# Patient Record
Sex: MALE | Race: WHITE | Employment: FULL TIME | URBAN - METROPOLITAN AREA
[De-identification: names, ages, dates, MRNs, and addresses within clinical notes are randomized per-mention and may not be internally consistent; named-entity substitution may affect disease eponyms.]

---

## 2018-07-05 ENCOUNTER — TELEPHONE (OUTPATIENT)
Dept: NON INVASIVE DIAGNOSTICS | Age: 28
End: 2018-07-05

## 2018-07-05 ENCOUNTER — OFFICE VISIT (OUTPATIENT)
Dept: PRIMARY CARE CLINIC | Age: 28
End: 2018-07-05
Payer: COMMERCIAL

## 2018-07-05 VITALS
HEART RATE: 83 BPM | HEIGHT: 68 IN | SYSTOLIC BLOOD PRESSURE: 114 MMHG | BODY MASS INDEX: 19.85 KG/M2 | DIASTOLIC BLOOD PRESSURE: 80 MMHG | WEIGHT: 131 LBS | TEMPERATURE: 98 F | RESPIRATION RATE: 18 BRPM | OXYGEN SATURATION: 98 %

## 2018-07-05 DIAGNOSIS — R56.9 SEIZURE (HCC): ICD-10-CM

## 2018-07-05 DIAGNOSIS — R55 SYNCOPE AND COLLAPSE: Primary | ICD-10-CM

## 2018-07-05 PROCEDURE — 99214 OFFICE O/P EST MOD 30 MIN: CPT | Performed by: INTERNAL MEDICINE

## 2018-07-05 RX ORDER — LEVETIRACETAM 500 MG/1
500 TABLET ORAL DAILY
COMMUNITY
End: 2018-08-02 | Stop reason: DRUGHIGH

## 2018-07-05 ASSESSMENT — PATIENT HEALTH QUESTIONNAIRE - PHQ9
SUM OF ALL RESPONSES TO PHQ9 QUESTIONS 1 & 2: 0
1. LITTLE INTEREST OR PLEASURE IN DOING THINGS: 0
2. FEELING DOWN, DEPRESSED OR HOPELESS: 0
SUM OF ALL RESPONSES TO PHQ QUESTIONS 1-9: 0

## 2018-07-05 ASSESSMENT — ENCOUNTER SYMPTOMS
STRIDOR: 0
SHORTNESS OF BREATH: 0
BLURRED VISION: 0
EYE PAIN: 0
BLOOD IN STOOL: 0
DOUBLE VISION: 0
EYE REDNESS: 0
HEMOPTYSIS: 0
NAUSEA: 0
DIARRHEA: 0

## 2018-07-06 ENCOUNTER — TELEPHONE (OUTPATIENT)
Dept: NEUROLOGY | Age: 28
End: 2018-07-06

## 2018-07-06 NOTE — PROGRESS NOTES
denies any chest pain, SOB, palpitations or syncope. He denies a family history of sudden cardiac death. Patient Active Problem List    Diagnosis Date Noted    Syncope 04/06/2016       Past Medical History:   Diagnosis Date    Seizure ([address]     Syncope and collapse        Family History   Problem Relation Age of Onset    Heart Disease Mother        Social History   Substance Use Topics    Smoking status: Never Smoker    Smokeless tobacco: Never Used    Alcohol use Yes      Comment: occasional        Current Outpatient Prescriptions   Medication Sig Dispense Refill    levETIRAcetam (KEPPRA) 500 MG tablet Take 500 mg by mouth daily      Cholecalciferol (VITAMIN D3) 1000 units CAPS Take by mouth      Ascorbic Acid (VITAMIN C ADULT GUMMIES PO) Take by mouth      Multiple Vitamins-Minerals (MULTI COMPLETE PO) Take by mouth      Garlic 7250 MG CAPS Take by mouth       No current facility-administered medications for this visit. Allergies   Allergen Reactions    Red Dye Nausea And Vomiting    Sulfa Antibiotics Rash       ROS:   Constitutional: Negative for fever, activity change and appetite change. HENT: Negative for epistaxis. Eyes: Negative for diploplia, blurred vision. Respiratory: Negative for cough, chest tightness, shortness of breath and wheezing. Cardiovascular: pertinent positives in HPI  Gastrointestinal: Negative for abdominal pain and blood in stool. All other review of systems are negative     PHYSICAL EXAM:   Vitals:    07/13/18 0927   BP: 116/78   Pulse: 70   Resp: 14   Weight: 134 lb 12.8 oz (61.1 kg)   Height: 5' 8\" (1.727 m)      Constitutional: Well-developed, no acute distress  Eyes: conjunctivae normal, no xanthelasma   Ears, Nose, Throat: oral mucosa moist, no cyanosis   CV: no JVD. Regular rate and rhythm. Normal S1S2 and no S3. No murmurs, rubs, or gallops.  PMI is nondisplaced  Lungs: clear to auscultation bilaterally, normal respiratory effort without used of

## 2018-07-06 NOTE — TELEPHONE ENCOUNTER
MA left message (first attempt) for pt to return call and schedule a new pt appt for syncope/seizure.   Electronically signed by Alli Garrido on 7/6/18 at 9:10 AM

## 2018-07-09 ENCOUNTER — HOSPITAL ENCOUNTER (OUTPATIENT)
Age: 28
Discharge: HOME | End: 2018-07-11
Payer: COMMERCIAL

## 2018-07-09 DIAGNOSIS — R56.9 SEIZURE (HCC): ICD-10-CM

## 2018-07-09 DIAGNOSIS — R55 SYNCOPE AND COLLAPSE: ICD-10-CM

## 2018-07-09 LAB
ALBUMIN SERPL-MCNC: 4.6 G/DL (ref 3.5–5.2)
ALP BLD-CCNC: 83 U/L (ref 40–129)
ALT SERPL-CCNC: 12 U/L (ref 0–40)
ANION GAP SERPL CALCULATED.3IONS-SCNC: 12 MMOL/L (ref 7–16)
AST SERPL-CCNC: 19 U/L (ref 0–39)
BASOPHILS ABSOLUTE: 0.05 E9/L (ref 0–0.2)
BASOPHILS RELATIVE PERCENT: 0.8 % (ref 0–2)
BILIRUB SERPL-MCNC: 1.1 MG/DL (ref 0–1.2)
BUN BLDV-MCNC: 14 MG/DL (ref 6–20)
CALCIUM SERPL-MCNC: 9.4 MG/DL (ref 8.6–10.2)
CHLORIDE BLD-SCNC: 101 MMOL/L (ref 98–107)
CO2: 26 MMOL/L (ref 22–29)
CREAT SERPL-MCNC: 0.7 MG/DL (ref 0.7–1.2)
EOSINOPHILS ABSOLUTE: 0.21 E9/L (ref 0.05–0.5)
EOSINOPHILS RELATIVE PERCENT: 3.4 % (ref 0–6)
GFR AFRICAN AMERICAN: >60
GFR NON-AFRICAN AMERICAN: >60 ML/MIN/1.73
GLUCOSE BLD-MCNC: 90 MG/DL (ref 74–109)
HCT VFR BLD CALC: 46.1 % (ref 37–54)
HEMOGLOBIN: 15.7 G/DL (ref 12.5–16.5)
IMMATURE GRANULOCYTES #: 0.01 E9/L
IMMATURE GRANULOCYTES %: 0.2 % (ref 0–5)
LYMPHOCYTES ABSOLUTE: 2.55 E9/L (ref 1.5–4)
LYMPHOCYTES RELATIVE PERCENT: 41.3 % (ref 20–42)
MCH RBC QN AUTO: 31.5 PG (ref 26–35)
MCHC RBC AUTO-ENTMCNC: 34.1 % (ref 32–34.5)
MCV RBC AUTO: 92.4 FL (ref 80–99.9)
MONOCYTES ABSOLUTE: 0.46 E9/L (ref 0.1–0.95)
MONOCYTES RELATIVE PERCENT: 7.4 % (ref 2–12)
NEUTROPHILS ABSOLUTE: 2.9 E9/L (ref 1.8–7.3)
NEUTROPHILS RELATIVE PERCENT: 46.9 % (ref 43–80)
PDW BLD-RTO: 12.6 FL (ref 11.5–15)
PLATELET # BLD: 211 E9/L (ref 130–450)
PMV BLD AUTO: 9.7 FL (ref 7–12)
POTASSIUM SERPL-SCNC: 3.8 MMOL/L (ref 3.5–5)
RBC # BLD: 4.99 E12/L (ref 3.8–5.8)
SODIUM BLD-SCNC: 139 MMOL/L (ref 132–146)
TOTAL PROTEIN: 7.1 G/DL (ref 6.4–8.3)
WBC # BLD: 6.2 E9/L (ref 4.5–11.5)

## 2018-07-09 PROCEDURE — 84443 ASSAY THYROID STIM HORMONE: CPT

## 2018-07-09 PROCEDURE — 80053 COMPREHEN METABOLIC PANEL: CPT

## 2018-07-09 PROCEDURE — 85025 COMPLETE CBC W/AUTO DIFF WBC: CPT

## 2018-07-10 LAB — TSH SERPL DL<=0.05 MIU/L-ACNC: 2.82 UIU/ML (ref 0.27–4.2)

## 2018-07-13 ENCOUNTER — TELEPHONE (OUTPATIENT)
Dept: PRIMARY CARE CLINIC | Age: 28
End: 2018-07-13

## 2018-07-13 ENCOUNTER — OFFICE VISIT (OUTPATIENT)
Dept: NON INVASIVE DIAGNOSTICS | Age: 28
End: 2018-07-13
Payer: COMMERCIAL

## 2018-07-13 VITALS
HEIGHT: 68 IN | DIASTOLIC BLOOD PRESSURE: 78 MMHG | HEART RATE: 70 BPM | BODY MASS INDEX: 20.43 KG/M2 | SYSTOLIC BLOOD PRESSURE: 116 MMHG | RESPIRATION RATE: 14 BRPM | WEIGHT: 134.8 LBS

## 2018-07-13 DIAGNOSIS — R55 SYNCOPE, UNSPECIFIED SYNCOPE TYPE: Primary | ICD-10-CM

## 2018-07-13 PROCEDURE — 99204 OFFICE O/P NEW MOD 45 MIN: CPT | Performed by: INTERNAL MEDICINE

## 2018-07-13 PROCEDURE — 93000 ELECTROCARDIOGRAM COMPLETE: CPT | Performed by: INTERNAL MEDICINE

## 2018-07-13 NOTE — TELEPHONE ENCOUNTER
Advise him I review cardiology evaluation, agree with the plan.   Will discuss with him at up coming visit

## 2018-07-19 ENCOUNTER — HOSPITAL ENCOUNTER (OUTPATIENT)
Dept: CARDIOLOGY | Age: 28
Discharge: HOME | End: 2018-07-19
Payer: COMMERCIAL

## 2018-07-19 DIAGNOSIS — R55 SYNCOPE, UNSPECIFIED SYNCOPE TYPE: ICD-10-CM

## 2018-07-19 LAB
LV EF: 60 %
LVEF MODALITY: NORMAL

## 2018-07-19 PROCEDURE — 93306 TTE W/DOPPLER COMPLETE: CPT

## 2018-07-20 ENCOUNTER — TELEPHONE (OUTPATIENT)
Dept: NON INVASIVE DIAGNOSTICS | Age: 28
End: 2018-07-20

## 2018-07-25 ENCOUNTER — HOSPITAL ENCOUNTER (OUTPATIENT)
Dept: CARDIAC CATH/INVASIVE PROCEDURES | Age: 28
Discharge: HOME | End: 2018-07-25
Payer: COMMERCIAL

## 2018-07-25 VITALS
OXYGEN SATURATION: 100 % | HEART RATE: 75 BPM | SYSTOLIC BLOOD PRESSURE: 127 MMHG | DIASTOLIC BLOOD PRESSURE: 90 MMHG | RESPIRATION RATE: 15 BRPM | WEIGHT: 130 LBS | BODY MASS INDEX: 19.7 KG/M2 | HEIGHT: 68 IN | TEMPERATURE: 98.6 F

## 2018-07-25 PROCEDURE — 93660 TILT TABLE EVALUATION: CPT | Performed by: INTERNAL MEDICINE

## 2018-07-25 NOTE — PROCEDURES
[address] [facility] Electrophysiology  Procedure Report  PATIENT: Narcisa Horvath  MEDICAL RECORD NUMBER: [id number]  DATE OF PROCEDURE:  7/25/2018  ATTENDING ELECTROPHYSIOLOGIST:  Citlaly Zambrano MD  REFERRING PHYSICIAN: Dr. Subhash Singh:    1. Tilt Table Testing    INDICATION:  Syncope    PROCEDURE PERFORMED BY: Citlaly Zambrano MD    PROCEDURE TIME: Thirty minutes    COMPLICATIONS: None immediately apparent    DESCRIPTION OF PROCEDURE: The risks, benefits, and alternatives to the procedure were discussed with the patient, and informed consent was obtained. The patient was brought to the Tilt table lab. Baseline supine blood pressures and heart rates were obtained. The baseline blood pressure was 125/77 mmHg and baseline heart rate is 69 bpm.  After 5 minutes in the supine position, the patient was placed in the 70 degree head-upright position. After approximately 20 minutes no symptoms had occurred and thus, a single 0.4mg sublingual nitroglycerine tablet was given and the 70 degree head-upright position was continued for 10 minutes. The gurpreet BP recorded was 78/palpable mmHg with a heart rate of 104 bpm.  The patient had symptoms of headache and nausea. The patient was placed back down in the supine position. At the conclusion of tilt table testing, the patient's blood pressure and heart rate were back at baseline. SUMMARY:  1. Negative tilt table test for Neurocardiogenic syncope. RECOMMENDATIONS:  1. Maintain adequate hydration. Drink approximately 1-2 L of non-caffeinated beverage/ per day. 2. Limit caffeine and alcohol use. 3. Liberalize salt intake. 4. If prescribed, please wear the support stockings or compression socks as instructed. 5. If medication is prescribed, please take it as instructed. 6. Advised to see neurologist for work up for possible seizure.     Citlaly Zambrano MD  Cardiac Electrophysiology  [facility] [place] ([facility] Physicians  [facility]  9:51 AM  7/25/2018

## 2018-08-02 ENCOUNTER — OFFICE VISIT (OUTPATIENT)
Dept: PRIMARY CARE CLINIC | Age: 28
End: 2018-08-02
Payer: COMMERCIAL

## 2018-08-02 VITALS
WEIGHT: 135.5 LBS | TEMPERATURE: 98.3 F | SYSTOLIC BLOOD PRESSURE: 102 MMHG | RESPIRATION RATE: 18 BRPM | HEART RATE: 80 BPM | BODY MASS INDEX: 20.54 KG/M2 | OXYGEN SATURATION: 97 % | DIASTOLIC BLOOD PRESSURE: 64 MMHG | HEIGHT: 68 IN

## 2018-08-02 DIAGNOSIS — Z86.69 HX OF SEIZURE DISORDER: ICD-10-CM

## 2018-08-02 DIAGNOSIS — R55 SYNCOPE, UNSPECIFIED SYNCOPE TYPE: Primary | ICD-10-CM

## 2018-08-02 PROCEDURE — 99213 OFFICE O/P EST LOW 20 MIN: CPT | Performed by: INTERNAL MEDICINE

## 2018-08-02 RX ORDER — LEVETIRACETAM 500 MG/1
500 TABLET, EXTENDED RELEASE ORAL DAILY
Qty: 30 TABLET | Refills: 1 | Status: SHIPPED | OUTPATIENT
Start: 2018-08-02 | End: 2018-09-13 | Stop reason: SDUPTHER

## 2018-08-02 RX ORDER — LEVETIRACETAM 500 MG/1
500 TABLET, EXTENDED RELEASE ORAL DAILY
COMMUNITY
End: 2018-08-02 | Stop reason: SDUPTHER

## 2018-08-02 ASSESSMENT — ENCOUNTER SYMPTOMS
DOUBLE VISION: 0
BLOOD IN STOOL: 0
HEMOPTYSIS: 0
SHORTNESS OF BREATH: 0
NAUSEA: 0
BLURRED VISION: 0
DIARRHEA: 0
EYE REDNESS: 0
EYE PAIN: 0
STRIDOR: 0

## 2018-08-02 NOTE — PROGRESS NOTES
HPI:       Patient was here last 7/5/18    Chief complaint reason for the visit follow-up visit to evaluation for complaints of syncope and/or seizure disorder. Thus far the patient has been seen by electrophysiology who has done an echocardiogram tilt table test.  Both studies were negative and reviewed by me with the patient. The patient is now wearing a 30 day cardiac monitor which will be completed within the next 2 weeks. He is awaiting a visit with neurology which will not occur until September 13. He remains on Keppra 500 mg, extended release, 1 daily he has had no further complaints of any episodes of near syncope and/or seizures since I saw him last.    We reviewed laboratory studies were performed including CBC, thyroid studies, metabolic profile all of which were normal.    Review of Systems  Review of Systems   Constitutional: Negative for chills and fever. HENT: Negative for congestion, ear discharge and ear pain. Eyes: Negative for blurred vision, double vision, pain and redness. Respiratory: Negative for hemoptysis, shortness of breath and stridor. Cardiovascular: Negative for chest pain, claudication, leg swelling and PND. Gastrointestinal: Negative for blood in stool, diarrhea and nausea. Genitourinary: Negative for dysuria, hematuria and urgency. Musculoskeletal: Negative for falls and joint pain. Skin: Negative for rash. Neurological: Negative for dizziness, focal weakness, seizures and loss of consciousness. Psychiatric/Behavioral: Negative for depression, hallucinations, memory loss and suicidal ideas. PE:  VS:  /64 (Site: Left Arm, Position: Sitting, Cuff Size: Small Adult)   Pulse 80   Temp 98.3 °F (36.8 °C) (Tympanic)   Resp 18   Ht 5' 8\" (1.727 m)   Wt 135 lb 8 oz (61.5 kg)   SpO2 97%   BMI 20.60 kg/m²   Physical Exam   Constitutional: He is oriented to person, place, and time. He appears well-developed and well-nourished.    HENT:   Head:

## 2018-08-15 DIAGNOSIS — R55 SYNCOPE, UNSPECIFIED SYNCOPE TYPE: ICD-10-CM

## 2018-08-16 ENCOUNTER — TELEPHONE (OUTPATIENT)
Dept: NON INVASIVE DIAGNOSTICS | Age: 28
End: 2018-08-16

## 2018-08-27 NOTE — PROGRESS NOTES
[address] [facility] [facility]  Progress Note  PATIENT: Eduar Hayes  MEDICAL RECORD NUMBER: [id number]  DATE OF SERVICE:  8/29/2018  ATTENDING ELECTROPHYSIOLOGIST: Laron Hopkins MD  REFERRING PHYSICIAN: Qamar Yen MD  CHIEF COMPLAINT: Loss of consciousness    HPI: This is a 28 y.o. male with a history of   Patient Active Problem List   Diagnosis    Syncope    Hx of seizure disorder   who presents to EP clinic for follow up of his syncope. Since last visit on 7/13/18 he underwent echocardiogram, 30 days cardiac monitor and TTT which were normal. He is scheduled to see neurologist on 9/13/18. He states he feels overall well and can complete his ADL's. He presents today in NSR and denies any chest pain, dyspnea, palpitations or syncope. Patient Active Problem List    Diagnosis Date Noted    Hx of seizure disorder 08/02/2018    Syncope 04/06/2016       Past Medical History:   Diagnosis Date    Seizure ([address]     Syncope and collapse        Family History   Problem Relation Age of Onset    Heart Disease Mother        Social History   Substance Use Topics    Smoking status: Never Smoker    Smokeless tobacco: Never Used    Alcohol use Yes      Comment: occasional        Current Outpatient Prescriptions   Medication Sig Dispense Refill    MAGNESIUM-POTASSIUM PO Take by mouth daily      levETIRAcetam (KEPPRA XR) 500 MG TB24 extended release tablet Take 1 tablet by mouth daily 30 tablet 1    Cholecalciferol (VITAMIN D3) 1000 units CAPS Take by mouth      Ascorbic Acid (VITAMIN C ADULT GUMMIES PO) Take by mouth      Multiple Vitamins-Minerals (MULTI COMPLETE PO) Take by mouth      Garlic 1105 MG CAPS Take by mouth       No current facility-administered medications for this visit.          Allergies   Allergen Reactions    Red Dye Nausea And Vomiting    Sulfa Antibiotics Rash       ROS:   Constitutional: Negative for fever, activity change and appetite change. HENT: Negative for epistaxis. Eyes: Negative for diploplia, blurred vision. Respiratory: Negative for cough, chest tightness, shortness of breath and wheezing. Cardiovascular: pertinent positives in HPI  Gastrointestinal: Negative for abdominal pain and blood in stool. All other review of systems are negative     PHYSICAL EXAM:   Vitals:    08/29/18 0844   BP: 110/66   Pulse: 75   Resp: 16   Weight: 135 lb (61.2 kg)   Height: 5' 8\" (1.727 m)      Constitutional: Well-developed, no acute distress  Eyes: conjunctivae normal, no xanthelasma   Ears, Nose, Throat: oral mucosa moist, no cyanosis   CV: no JVD. Regular rate and rhythm. Normal S1S2 and no S3. No murmurs, rubs, or gallops. PMI is nondisplaced  Lungs: clear to auscultation bilaterally, normal respiratory effort without used of accessory muscles  Abdomen: soft, non-tender, bowel sounds present, no masses or hepatomegaly   Musculoskeletal: no digital clubbing, no edema   Skin: warm, no rashes     I have personally reviewed the laboratory, cardiac diagnostic and radiographic testing as outlined below:    Data:    No results for input(s): WBC, HGB, HCT, PLT in the last 72 hours. No results for input(s): NA, K, CL, CO2, BUN, CREATININE, CALCIUM in the last 72 hours. Invalid input(s): GLU, MAGNESIUM   Lab Results   Component Value Date    MG 2.3 09/11/2017     No results for input(s): TSH in the last 72 hours. No results for input(s): INR in the last 72 hours. CXR 9/11/17:  Chest AP view.        Comparison: April 6, 2016.       History: Chest pain.       FINDINGS: Cardiac area has normal size. The lungs are well expanded. No acute infiltrates, consolidation or pleural effusions are seen.        There is no perihilar vascular congestion.            Impression   No acute cardiopulmonary process     EKG 8/29/18: sinus rhythm 75 bpm, QTc 398, no delta wave, Q waves in lateral leads.  (Please see scan in effusion.      Conclusions      Summary   Normal left ventricle size and systolic function   Ejection fraction is visually estimated at 60%.   Physiologic and/or trace mitral regurgitation is present.      Signature      ----------------------------------------------------------------   Electronically signed by Anselmo Maza MD(Interpreting   physician) on 04/07/2016 07:05 PM   ----------------------------------------------------------------     M-Mode/2D Measurements & Calculations      LV Diastolic     LV Systolic Dimension: 3 cm     AV Cusp Separation: 1.8   Dimension: 4.8   LV Volume Diastolic: 794.8 ml   cmAO Root Dimension: 2.8   cm               LV Volume Systolic: 36.0 ml     cm   LV FS:37.5 %     LV EDV/LV EDV Index: 108. 6   LV PW Diastolic: VI/68 DJ/G^3ZD ESV/LV ESV   0.6 cm           Index: 36.3 ml/21ml/ m^2   Septum           EF Calculated: 66.6 %           RV Diastolic Dimension:   Diastolic: 0.6   LV Mass Index: 50 l/min*m^2     2.1 cm   cm                                                    LA/Aorta: 1.07   LV Mass: 88.3 g                                                    LA volume/Index: 26.19 ml                                                    /14. 88ml/m^2     Doppler Measurements & Calculations      MV Peak E-Wave:   AV Peak Velocity: 1.2 m/s    LVOT Peak Velocity: 1.08   1.08 m/s          AV Peak Gradient: 5.74 mmHg  m/s   MV Peak A-Wave:   AV Mean Velocity: 0.81 m/s   LVOT Mean Velocity: 0.78   0.43 m/s          AV Mean Gradient: 2.9 mmHg   m/s   MV E/A Ratio:     AV VTI: 22.9 cm              LVOT Peak Gradient: 4.6   2.51                                           mmHgLVOT Mean Gradient: 2.7   MV Peak Gradient: LVOT VTI: 20.7 cm            mmHg   4.7 mmHg   MV Mean Gradient:   1.6 mmHg          Pulm. Vein A Reversal   MV Mean Velocity: Duration:88.7 msec           TR Velocity:1.92 m/s   0.59 m/s          Pulm. Vein D Velocity:0.49   TR Gradient:14.75 mmHg   MV Deceleration   m/sPulm.  Vein A Reversal     PV Peak Velocity: 0.83 m/s   Time: 206.9 msec  Velocity:0.21 m/s            PV Peak Gradient: 2.76 mmHg   MV P1/2t: 105. 9   Pulm. Vein S Velocity: 0.41  PV Mean Velocity: 0.59 m/s   msec              m/s                          PV Mean Gradient: 1.5 mmHg   MVA by PHT:2.08   cm^2      MV E' Septal   Velocity: 0.18   m/s   MV E' Lateral   Velocity: 0.18 m/s    Echo 7/19/18:  Type of Study      TTE procedure:Echo Complete W/ Dop & Color Flow.     Procedure Date  Date: 07/19/2018 Start: 11:03 AM    Study Location: Echo Lab  Technical Quality: Adequate visualization    Indications:LV function. Patient Status: Routine    Height: 68 inches Weight: 134 pounds BSA: 1.72 m^2 BMI: 20.37 kg/m^2    BP: 116/78 mmHg     Findings      Left Ventricle   Left ventricular internal dimensions, wall thickness, regional wall   motion, and systolic function appear to be normal.   Ejection fraction is visually estimated at 60%.  Doppler indices of diastolic function are normal.      Right Ventricle   Normal right ventricular size and systolic function.   TAPSE is 23 mm consistent with normal global right ventricular systolic   function.      Left Atrium   The left atrium appeared normal in size. No obvious mass or thrombus was   seen.      Right Atrium   Normal right atrium.      Mitral Valve   Normal mitral valve structure and function.   Physiologic and/or trace mitral regurgitation is present.   No evidence of mitral valve stenosis.      Tricuspid Valve   Normal tricuspid valve structure and function. Physiologic tricuspid   insufficiency noted. Right ventricular systolic pressure is within normal   limits.      Aortic Valve   The aortic valve is trileaflet with good leaflet separation. No   significant aortic stenosis or insufficiency is present.      Pulmonic Valve   Normal pulmonic valve structure and function.  Physiologic pulmonic   insufficiency is present.      Pericardial Effusion   No evidence for hemodynamically significant pericardial effusion.      Aorta   Normal aortic root and ascending aorta.   Miscellaneous   The inferior vena cava diameter is normal with normal respiratory   variation.      Conclusions      Summary   Left ventricular internal dimensions, wall thickness, regional wall   motion, and systolic function appear to be normal.   Ejection fraction is visually estimated at 60%.    Doppler indices of diastolic function are normal.   No abnormality of chamber dimensions or valve functions is noted.   Normal echocardiogram.      Signature      ----------------------------------------------------------------   Electronically signed by Stu Calix MD(Interpreting   physician) on 07/19/2018 06:29 PM   ----------------------------------------------------------------     M-Mode/2D Measurements & Calculations      LV Diastolic    LV Systolic Dimension: 2.9   AV Cusp Separation: 1.8 cmLA   Dimension: 4.3  cm                           Dimension: 2.7 cmAO Root   cm              LV Volume Diastolic: 40.5 ml Dimension: 2.5 cm   LV FS:32.6 %    LV Volume Systolic: 75.4 ml   LV PW           LV EDV/LV EDV Index: 76.2   Diastolic: 0.8  ZA/00 FM/R^5PO ESV/LV ESV   cm              Index: 31.9 ml/19ml/ m^2     RV Diastolic Dimension: 2.3   LV PW Systolic: EF Calculated: 35.3 %        cm   1.2 cm          LV Mass Index: 56 l/min*m^2   Septum                                       LA/Aorta: 1.2   Diastolic: 0.7                               Ascending Aorta: 2.2 cm   cm              LVOT: 2 cm                   LA volume/Index: 23.3 ml   Septum                                       /53IL/R^1   Systolic: 1 cm                               RA Area: 10.3 cm^2      LV Mass: 96.78   g     Doppler Measurements & Calculations      MV Peak E-Wave:   AV Peak Velocity: 1.38 m/s     LVOT Peak Velocity: 1.26   1.1 m/s           AV Peak Gradient: 7.66 mmHg    m/s   MV Peak A-Wave:   AV Mean Velocity: 0.87 m/s     LVOT Mean placed in the 70 degree head-upright position. After approximately 20 minutes no symptoms had occurred and thus, a single 0.4mg sublingual nitroglycerine tablet was given and the 70 degree head-upright position was continued for 10 minutes. The gurpreet BP recorded was 78/palpable mmHg with a heart rate of 104 bpm.  The patient had symptoms of headache and nausea. The patient was placed back down in the supine position. At the conclusion of tilt table testing, the patient's blood pressure and heart rate were back at baseline.     SUMMARY:  1. Negative tilt table test for Neurocardiogenic syncope.     RECOMMENDATIONS:  1. Maintain adequate hydration. Drink approximately 1-2 L of non-caffeinated beverage/ per day. 2. Limit caffeine and alcohol use. 3. Liberalize salt intake. 4. If prescribed, please wear the support stockings or compression socks as instructed. 5. If medication is prescribed, please take it as instructed. 6. Advised to see neurologist for work up for possible seizure.     I have independently reviewed all of the ECGs and rhythm strips per above     Assessment/Plan: This is a 28 y.o. male with a history of   Patient Active Problem List   Diagnosis    Syncope    Hx of seizure disorder    who presents with recurrent loss of consciousness    1. Recurrent loss of consciousness. - Unlikely to be vasovagal or cardiac in origin.  - Possible seizure. - Await for neurological input from neurology. - Continue anticonvulsant.  - Encouraged adequate hydration, not skipping meals and avoiding caffeine . Recommendations:  1. Will continue current treatment. 2. Await for neurology's input. 3. Follow up as needed and encourage to call for any questions or concerns. I have spent a total of 30 minutes with the patient reviewing the above stated recommendations.   And a total of >50% of that time involved face-to-face time providing counseling and or coordination of care with the other

## 2018-08-29 ENCOUNTER — OFFICE VISIT (OUTPATIENT)
Dept: NON INVASIVE DIAGNOSTICS | Age: 28
End: 2018-08-29
Payer: COMMERCIAL

## 2018-08-29 VITALS
DIASTOLIC BLOOD PRESSURE: 66 MMHG | RESPIRATION RATE: 16 BRPM | SYSTOLIC BLOOD PRESSURE: 110 MMHG | HEART RATE: 75 BPM | HEIGHT: 68 IN | BODY MASS INDEX: 20.46 KG/M2 | WEIGHT: 135 LBS

## 2018-08-29 DIAGNOSIS — R55 SYNCOPE AND COLLAPSE: Primary | ICD-10-CM

## 2018-08-29 PROCEDURE — 99214 OFFICE O/P EST MOD 30 MIN: CPT | Performed by: INTERNAL MEDICINE

## 2018-08-29 PROCEDURE — 93000 ELECTROCARDIOGRAM COMPLETE: CPT | Performed by: INTERNAL MEDICINE

## 2018-08-29 NOTE — PROGRESS NOTES
This patient was supposed to be seen by Neurology, please find out when that appointment will take place. Jocy Rangel do I see him back in the ofifce?

## 2018-09-13 ENCOUNTER — OFFICE VISIT (OUTPATIENT)
Dept: NEUROLOGY | Age: 28
End: 2018-09-13
Payer: COMMERCIAL

## 2018-09-13 VITALS
WEIGHT: 132.8 LBS | HEIGHT: 68 IN | OXYGEN SATURATION: 99 % | DIASTOLIC BLOOD PRESSURE: 78 MMHG | SYSTOLIC BLOOD PRESSURE: 125 MMHG | HEART RATE: 94 BPM | TEMPERATURE: 96.9 F | BODY MASS INDEX: 20.13 KG/M2

## 2018-09-13 DIAGNOSIS — Z86.69 HX OF SEIZURE DISORDER: ICD-10-CM

## 2018-09-13 DIAGNOSIS — R56.9 SEIZURES (HCC): Primary | ICD-10-CM

## 2018-09-13 PROCEDURE — 99204 OFFICE O/P NEW MOD 45 MIN: CPT | Performed by: CLINICAL NURSE SPECIALIST

## 2018-09-13 RX ORDER — LEVETIRACETAM 500 MG/1
500 TABLET, EXTENDED RELEASE ORAL DAILY
Qty: 30 TABLET | Refills: 11 | Status: SHIPPED | OUTPATIENT
Start: 2018-09-13 | End: 2019-05-17 | Stop reason: DRUGHIGH

## 2018-12-27 NOTE — PROGRESS NOTES
driving   His last seizure was November 2017     Nia Glass  3:36 PM  9/13/2018    I spent 45 minutes with the patient, with 50% or more counseling them on their diagnosis, diagnostic workup and treatment options. Normal

## 2019-04-01 ENCOUNTER — HOSPITAL ENCOUNTER (EMERGENCY)
Age: 29
Discharge: HOME | End: 2019-04-01
Attending: EMERGENCY MEDICINE
Payer: COMMERCIAL

## 2019-04-01 VITALS
TEMPERATURE: 97.9 F | HEIGHT: 70 IN | BODY MASS INDEX: 20.76 KG/M2 | RESPIRATION RATE: 14 BRPM | HEART RATE: 82 BPM | SYSTOLIC BLOOD PRESSURE: 107 MMHG | OXYGEN SATURATION: 98 % | DIASTOLIC BLOOD PRESSURE: 67 MMHG | WEIGHT: 145 LBS

## 2019-04-01 DIAGNOSIS — R56.9 SEIZURE-LIKE ACTIVITY (HCC): Primary | ICD-10-CM

## 2019-04-01 LAB
ANION GAP SERPL CALCULATED.3IONS-SCNC: 19 MMOL/L (ref 7–16)
BASOPHILS ABSOLUTE: 0.04 E9/L (ref 0–0.2)
BASOPHILS RELATIVE PERCENT: 0.4 % (ref 0–2)
BUN BLDV-MCNC: 15 MG/DL (ref 6–20)
CALCIUM SERPL-MCNC: 9.6 MG/DL (ref 8.6–10.2)
CHLORIDE BLD-SCNC: 97 MMOL/L (ref 98–107)
CO2: 20 MMOL/L (ref 22–29)
CREAT SERPL-MCNC: 0.7 MG/DL (ref 0.7–1.2)
EOSINOPHILS ABSOLUTE: 0.05 E9/L (ref 0.05–0.5)
EOSINOPHILS RELATIVE PERCENT: 0.5 % (ref 0–6)
GFR AFRICAN AMERICAN: >60
GFR NON-AFRICAN AMERICAN: >60 ML/MIN/1.73
GLUCOSE BLD-MCNC: 105 MG/DL (ref 74–99)
HCT VFR BLD CALC: 44 % (ref 37–54)
HEMOGLOBIN: 15.9 G/DL (ref 12.5–16.5)
IMMATURE GRANULOCYTES #: 0.04 E9/L
IMMATURE GRANULOCYTES %: 0.4 % (ref 0–5)
LYMPHOCYTES ABSOLUTE: 1.73 E9/L (ref 1.5–4)
LYMPHOCYTES RELATIVE PERCENT: 17.3 % (ref 20–42)
MCH RBC QN AUTO: 32.6 PG (ref 26–35)
MCHC RBC AUTO-ENTMCNC: 36.1 % (ref 32–34.5)
MCV RBC AUTO: 90.2 FL (ref 80–99.9)
METER GLUCOSE: 109 MG/DL (ref 74–99)
MONOCYTES ABSOLUTE: 0.53 E9/L (ref 0.1–0.95)
MONOCYTES RELATIVE PERCENT: 5.3 % (ref 2–12)
NEUTROPHILS ABSOLUTE: 7.59 E9/L (ref 1.8–7.3)
NEUTROPHILS RELATIVE PERCENT: 76.1 % (ref 43–80)
PDW BLD-RTO: 12.2 FL (ref 11.5–15)
PLATELET # BLD: 249 E9/L (ref 130–450)
PMV BLD AUTO: 8.9 FL (ref 7–12)
POTASSIUM SERPL-SCNC: 3.2 MMOL/L (ref 3.5–5)
RBC # BLD: 4.88 E12/L (ref 3.8–5.8)
SODIUM BLD-SCNC: 136 MMOL/L (ref 132–146)
WBC # BLD: 10 E9/L (ref 4.5–11.5)

## 2019-04-01 PROCEDURE — 6370000000 HC RX 637 (ALT 250 FOR IP): Performed by: EMERGENCY MEDICINE

## 2019-04-01 PROCEDURE — 80048 BASIC METABOLIC PNL TOTAL CA: CPT

## 2019-04-01 PROCEDURE — 82962 GLUCOSE BLOOD TEST: CPT

## 2019-04-01 PROCEDURE — 6360000002 HC RX W HCPCS: Performed by: EMERGENCY MEDICINE

## 2019-04-01 PROCEDURE — 99285 EMERGENCY DEPT VISIT HI MDM: CPT

## 2019-04-01 PROCEDURE — 85025 COMPLETE CBC W/AUTO DIFF WBC: CPT

## 2019-04-01 PROCEDURE — 96365 THER/PROPH/DIAG IV INF INIT: CPT

## 2019-04-01 RX ORDER — LEVETIRACETAM 10 MG/ML
1000 INJECTION INTRAVASCULAR ONCE
Status: COMPLETED | OUTPATIENT
Start: 2019-04-01 | End: 2019-04-01

## 2019-04-01 RX ORDER — POTASSIUM CHLORIDE 20 MEQ/1
40 TABLET, EXTENDED RELEASE ORAL DAILY
Status: DISCONTINUED | OUTPATIENT
Start: 2019-04-01 | End: 2019-04-01 | Stop reason: HOSPADM

## 2019-04-01 RX ORDER — PROMETHAZINE HYDROCHLORIDE 25 MG/ML
25 INJECTION, SOLUTION INTRAMUSCULAR; INTRAVENOUS ONCE
Status: DISCONTINUED | OUTPATIENT
Start: 2019-04-01 | End: 2019-04-01 | Stop reason: HOSPADM

## 2019-04-01 RX ADMIN — POTASSIUM BICARBONATE 40 MEQ: 782 TABLET, EFFERVESCENT ORAL at 20:39

## 2019-04-01 RX ADMIN — LEVETIRACETAM 1000 MG: 10 INJECTION INTRAVENOUS at 19:25

## 2019-04-01 ASSESSMENT — ENCOUNTER SYMPTOMS
NAUSEA: 0
ABDOMINAL PAIN: 0
SHORTNESS OF BREATH: 0
BACK PAIN: 0
DIARRHEA: 0
COUGH: 0

## 2019-04-01 NOTE — ED PROVIDER NOTES
This is a 33 year male with a PMH of Seizure Disoder who presents to the ED with a complaint of seizure like activity. 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 The patient remarks that he then felt as though he was going to have a seizure and had a vision of many colors and had a slumping to his piano. He states that this lasted for five minutes. He states that he has had over 8 episodes over the years similar to these sensations. He states that when he holds his urine or stool in and cant use the restroom these seem to come on. The patient denies any head trauma, use of alchol or drugs. The history is provided by the patient. No  was used. Review of Systems   Constitutional: Negative for fever. HENT: Negative for congestion. Eyes: Negative for visual disturbance. Respiratory: Negative for cough and shortness of breath. Cardiovascular: Negative for chest pain. Gastrointestinal: Negative for abdominal pain, diarrhea and nausea. Endocrine: Negative for polyuria. Genitourinary: Negative for dysuria. Musculoskeletal: Negative for back pain. Skin: Positive for wound. Allergic/Immunologic: Negative for immunocompromised state. Neurological: Positive for seizures. Hematological: Does not bruise/bleed easily. Psychiatric/Behavioral: Negative for confusion. Physical Exam   Constitutional: He is oriented to person, place, and time. He appears well-developed and well-nourished. No distress. HENT:   Head: Normocephalic and atraumatic. Mouth/Throat: Oropharynx is clear and moist.   Superficial abrasion to tongue   Eyes: Pupils are equal, round, and reactive to light. EOM are normal.   Neck: Normal range of motion. Neck supple. Cardiovascular: Normal rate and regular rhythm. Pulmonary/Chest: Effort normal and breath sounds normal. No stridor. No respiratory distress. He has no wheezes.    Abdominal:

## 2019-04-09 ENCOUNTER — OFFICE VISIT (OUTPATIENT)
Dept: PRIMARY CARE CLINIC | Age: 29
End: 2019-04-09
Payer: COMMERCIAL

## 2019-04-09 VITALS
BODY MASS INDEX: 19.26 KG/M2 | HEIGHT: 69 IN | RESPIRATION RATE: 12 BRPM | HEART RATE: 82 BPM | OXYGEN SATURATION: 98 % | DIASTOLIC BLOOD PRESSURE: 78 MMHG | WEIGHT: 130 LBS | SYSTOLIC BLOOD PRESSURE: 106 MMHG | TEMPERATURE: 98.5 F

## 2019-04-09 DIAGNOSIS — F41.9 ANXIETY: ICD-10-CM

## 2019-04-09 DIAGNOSIS — R55 SYNCOPE AND COLLAPSE: Primary | ICD-10-CM

## 2019-04-09 PROCEDURE — 99215 OFFICE O/P EST HI 40 MIN: CPT | Performed by: NURSE PRACTITIONER

## 2019-04-09 ASSESSMENT — ENCOUNTER SYMPTOMS
COLOR CHANGE: 0
COUGH: 0
VOMITING: 0
SHORTNESS OF BREATH: 0
CONSTIPATION: 1
CHEST TIGHTNESS: 0
ABDOMINAL PAIN: 0
NAUSEA: 0

## 2019-04-09 ASSESSMENT — PATIENT HEALTH QUESTIONNAIRE - PHQ9
1. LITTLE INTEREST OR PLEASURE IN DOING THINGS: 0
SUM OF ALL RESPONSES TO PHQ QUESTIONS 1-9: 0
2. FEELING DOWN, DEPRESSED OR HOPELESS: 0
SUM OF ALL RESPONSES TO PHQ9 QUESTIONS 1 & 2: 0
SUM OF ALL RESPONSES TO PHQ QUESTIONS 1-9: 0

## 2019-04-09 NOTE — PROGRESS NOTES
2019     Darshan Dear (:  1990) is a 33 y.o. male, here for evaluation of the following medical concerns:  Chief Complaint   Patient presents with    Follow-up     seizures        HPI:  33 y.o. male presents post ED visit for ? seizures     HX: presented In 2017 with episodes when eating dinner had an aura with \"lights flashing\" Knew something was going to happen, put head down and next thing her remembers EMT were there  Issue free all of 2018  April 1st 2019 same issue and was in ED .  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 The patient remarks that he then felt as though he was going to have a seizure and had a vision of many colors and had a slumping to his piano. He states that this lasted for five minutes. He states that he has had over 8 episodes over the years similar to these sensations. He states that when he holds his urine or stool in and cant use the restroom these seem to come on. The patient denies any head trauma, use of alchol or drugs     Did f/u with neurology last Sept and they believe it is seizures and continued med. Has appointment in May  Has  Had extensive cardiac w/u with tsh and lab,  tilt table testing, heart monitor, all normal and EEG normal.    He keeps mentioning his parents are worried. He is anxious and admitted to this after some discussion  So we had a long discussion about his stress levels and when these episodes occur . There are issues at school right now.  He agreed he may benefit from medication for his issues with anxiety. However, we will obtain a second opinion and proceed from there. ?pseudo seizures? Past Medical History:   Diagnosis Date    Seizure ([facility] [address]     Syncope and collapse        Current Outpatient Medications on File Prior to Visit   Medication Sig Dispense Refill    Potassium 99 MG TABS Take by mouth daily      levETIRAcetam (KEPPRA XR) 500 MG TB24 extended release tablet Take 1 tablet by mouth daily 30 tablet 11    MAGNESIUM-POTASSIUM PO Take by mouth daily      Cholecalciferol (VITAMIN D3) 1000 units CAPS Take by mouth      Ascorbic Acid (VITAMIN C ADULT GUMMIES PO) Take by mouth      Multiple Vitamins-Minerals (MULTI COMPLETE PO) Take by mouth      Garlic 4099 MG CAPS Take by mouth       No current facility-administered medications on file prior to visit. Allergies   Allergen Reactions    Red Dye Nausea And Vomiting    Sulfa Antibiotics Rash       Family History   Problem Relation Age of Onset    Heart Disease Mother        No past surgical history on file. Social History     Tobacco Use    Smoking status: Never Smoker    Smokeless tobacco: Never Used   Substance Use Topics    Alcohol use: Yes     Comment: occasional     Drug use: No       ROS:      Review of Systems   Constitutional: Positive for activity change and fatigue. Negative for appetite change, diaphoresis and fever. HENT: Negative. Respiratory: Negative for cough, chest tightness and shortness of breath. Cardiovascular: Negative for chest pain, palpitations and leg swelling. Gastrointestinal: Positive for constipation. Negative for abdominal pain, nausea and vomiting. Genitourinary: Negative. Musculoskeletal: Negative. Skin: Negative for color change and rash. Neurological: Positive for tremors and seizures. Negative for dizziness, facial asymmetry, speech difficulty, weakness, light-headedness and headaches.    Psychiatric/Behavioral: Positive for confusion, decreased concentration and sleep ASSESSMENT/PLAN:    John Paul Radford was seen today for follow-up. Diagnoses and all orders for this visit:    Syncope and collapse  Seek second opinion    Anxiety  Maybe try herbal such as rescue remedy         No orders of the defined types were placed in this encounter. No follow-ups on file. An electronic signature was used to authenticate this note.     --Anton Perry, LOGAN - CNP on 4/9/2019 at 2:30 PM

## 2019-05-15 ENCOUNTER — OFFICE VISIT (OUTPATIENT)
Dept: PRIMARY CARE CLINIC | Age: 29
End: 2019-05-15
Payer: COMMERCIAL

## 2019-05-15 VITALS
WEIGHT: 130.4 LBS | TEMPERATURE: 98.6 F | BODY MASS INDEX: 18.67 KG/M2 | OXYGEN SATURATION: 98 % | RESPIRATION RATE: 18 BRPM | SYSTOLIC BLOOD PRESSURE: 108 MMHG | DIASTOLIC BLOOD PRESSURE: 68 MMHG | HEIGHT: 70 IN | HEART RATE: 83 BPM

## 2019-05-15 DIAGNOSIS — Z86.69 HX OF SEIZURE DISORDER: Primary | ICD-10-CM

## 2019-05-15 DIAGNOSIS — E87.6 HYPOKALEMIA: ICD-10-CM

## 2019-05-15 PROCEDURE — 99214 OFFICE O/P EST MOD 30 MIN: CPT | Performed by: FAMILY MEDICINE

## 2019-05-15 NOTE — PROGRESS NOTES
nocturia, no dysuria    Vitals:    05/15/19 1528   BP: 108/68   Site: Left Upper Arm   Position: Sitting   Cuff Size: Small Adult   Pulse: 83   Resp: 18   Temp: 98.6 °F (37 °C)   TempSrc: Tympanic   SpO2: 98%   Weight: 130 lb 6.4 oz (59.1 kg)   Height: 5' 9.5\" (1.765 m)     Body mass index is 18.98 kg/m². General:  Patient alert and oriented x 3, NAD, pleasant  HEENT:  Atraumatic, normocephalic, pupils equal and normal, EOMI, clear conjunctiva, TM normal and clear, nose-clear, throat - no erythema  Neck:  Supple, no goiter, no carotid bruits, no LAD  Lungs:  CTA B, symmetric breath sounds, no resp distress  Heart:  RRR, no murmurs, gallops or rubs  Abdomen:  Soft/nt/nd, + bowel sounds  Extremities:  No clubbing, cyanosis or edema  Neuro: CN2-12 grossly intact, DTR 2+ b/l, normal gait, normal speech  Skin: unremarkable    Assessment/Plan:      William Jimenez was seen today for seizures. Diagnoses and all orders for this visit:    Hx of seizure disorder  Continue current Keppra. Emphasized importance of keeping appt with Dr. Humera York this week. Did discuss further EEG testing along with sleep-deprivation testing etc.  May need to further consider an anxiety component based on the evaluation. Hypokalemia, taking OTC supplements  -     Comprehensive Metabolic Panel; Future  -     Magnesium; Future      As above. Call or go to ED immediately if symptoms worsen or persist.  Return in about 3 months (around 8/15/2019). , or sooner if necessary. Spent over 25 minutes with patient of which greater than 50% was spent counseling regarding the above issues. Educational materials and/or home exercises printed for patient's review and were included in patient instructions on his/her After Visit Summary and given to patient at the end of visit. Counseled regarding above diagnosis, including possible risks and complications,  especially if left uncontrolled.     Counseled regarding the possible side effects, risks, benefits and alternatives to treatment; patient and/or guardian verbalizes understanding, agrees, feels comfortable with and wishes to proceed with above treatment plan. Advised patient to call with any new medication issues, and read all Rx info from pharmacy to assure aware of all possible risks and side effects of medication before taking. Reviewed age and gender appropriate health screening exams and vaccinations. Advised patient regarding importance of keeping up with recommended health maintenance and to schedule as soon as possible if overdue, as this is important in assessing for undiagnosed pathology, especially cancer, as well as protecting against potentially harmful/life threatening disease. Patient and/or guardian verbalizes understanding and agrees with above counseling, assessment and plan. All questions answered.

## 2019-05-16 ENCOUNTER — HOSPITAL ENCOUNTER (EMERGENCY)
Age: 29
Discharge: HOME | End: 2019-05-16
Attending: EMERGENCY MEDICINE
Payer: COMMERCIAL

## 2019-05-16 VITALS
HEART RATE: 97 BPM | BODY MASS INDEX: 19.26 KG/M2 | HEIGHT: 69 IN | RESPIRATION RATE: 16 BRPM | WEIGHT: 130 LBS | SYSTOLIC BLOOD PRESSURE: 122 MMHG | TEMPERATURE: 98 F | DIASTOLIC BLOOD PRESSURE: 75 MMHG | OXYGEN SATURATION: 100 %

## 2019-05-16 DIAGNOSIS — G40.919 BREAKTHROUGH SEIZURE (HCC): Primary | ICD-10-CM

## 2019-05-16 DIAGNOSIS — E87.6 HYPOKALEMIA: ICD-10-CM

## 2019-05-16 LAB
GFR AFRICAN AMERICAN: >60
GFR NON-AFRICAN AMERICAN: >60 ML/MIN/1.73
GLUCOSE BLD-MCNC: 148 MG/DL (ref 74–99)
PERFORMED ON: ABNORMAL
POC CHLORIDE: 107 MMOL/L (ref 100–108)
POC CREATININE: 0.8 MG/DL (ref 0.7–1.2)
POC POTASSIUM: 3.2 MMOL/L (ref 3.5–5)
POC SODIUM: 140 MMOL/L (ref 132–146)

## 2019-05-16 PROCEDURE — 82947 ASSAY GLUCOSE BLOOD QUANT: CPT

## 2019-05-16 PROCEDURE — 80177 DRUG SCRN QUAN LEVETIRACETAM: CPT

## 2019-05-16 PROCEDURE — 6360000002 HC RX W HCPCS: Performed by: EMERGENCY MEDICINE

## 2019-05-16 PROCEDURE — 99283 EMERGENCY DEPT VISIT LOW MDM: CPT

## 2019-05-16 PROCEDURE — 82435 ASSAY OF BLOOD CHLORIDE: CPT

## 2019-05-16 PROCEDURE — 84295 ASSAY OF SERUM SODIUM: CPT

## 2019-05-16 PROCEDURE — 6370000000 HC RX 637 (ALT 250 FOR IP): Performed by: EMERGENCY MEDICINE

## 2019-05-16 PROCEDURE — 84132 ASSAY OF SERUM POTASSIUM: CPT

## 2019-05-16 PROCEDURE — 96365 THER/PROPH/DIAG IV INF INIT: CPT

## 2019-05-16 PROCEDURE — 82565 ASSAY OF CREATININE: CPT

## 2019-05-16 RX ORDER — POTASSIUM CHLORIDE 20 MEQ/1
40 TABLET, EXTENDED RELEASE ORAL ONCE
Status: COMPLETED | OUTPATIENT
Start: 2019-05-16 | End: 2019-05-16

## 2019-05-16 RX ORDER — LEVETIRACETAM 10 MG/ML
1000 INJECTION INTRAVASCULAR ONCE
Status: COMPLETED | OUTPATIENT
Start: 2019-05-16 | End: 2019-05-16

## 2019-05-16 RX ADMIN — POTASSIUM CHLORIDE 40 MEQ: 20 TABLET, EXTENDED RELEASE ORAL at 21:45

## 2019-05-16 RX ADMIN — LEVETIRACETAM 1000 MG: 10 INJECTION, SOLUTION INTRAVENOUS at 20:45

## 2019-05-16 NOTE — ED PROVIDER NOTES
Mouth/Throat: Oropharynx is clear and moist and mucous membranes are normal.   Eyes: Conjunctivae are normal.   Neck: Normal range of motion. Neck supple. Cardiovascular: Normal rate, regular rhythm, intact distal pulses and normal pulses. Pulmonary/Chest: Effort normal and breath sounds normal. He has no wheezes. He has no rhonchi. He has no rales. Abdominal: Soft. Bowel sounds are normal. There is no tenderness. There is no rigidity, no rebound and no guarding. Neurological: He is alert and oriented to person, place, and time. No cranial nerve deficit (CN II-XII grossly intact) or sensory deficit. He exhibits normal muscle tone. Patient able to perform finger to nose and heel to shin without difficulty   Skin: Skin is warm and dry. Capillary refill takes less than 2 seconds. Psychiatric: He has a normal mood and affect. His speech is normal.   Nursing note and vitals reviewed. Procedures    MDM  Number of Diagnoses or Management Options  Breakthrough seizure ([facility] [address]): Hypokalemia:   Diagnosis management comments: Patient presents to the ED for breakthrough seizure. We initially obtained blood work. Patient has history of seizure and did not present with focal deficit as imaging not obtained. Patient was given keppra for their symptom. Workup in the ED revealed hypokalemia as patient was given dose in ED. Results of sodium within normal limits. Patient continues to be non-toxic on re-evaluation. Patient is hemodynamically stable. Findings were discussed with the patient and reasons to immediately return to the ED were articulated to them. They will follow-up with their PMD and neurologist tomorrow. Patient agrees with the plan and all questions were answered. --------------------------------------------- PAST HISTORY ---------------------------------------------  Past Medical History:  has a past medical history of Seizure ([address] and Syncope and collapse.     Past Surgical History:  has no

## 2019-05-17 ENCOUNTER — HOSPITAL ENCOUNTER (OUTPATIENT)
Age: 29
Discharge: HOME | End: 2019-05-17
Payer: COMMERCIAL

## 2019-05-17 ENCOUNTER — OFFICE VISIT (OUTPATIENT)
Dept: NEUROLOGY | Age: 29
End: 2019-05-17
Payer: COMMERCIAL

## 2019-05-17 VITALS
HEIGHT: 70 IN | WEIGHT: 130 LBS | BODY MASS INDEX: 18.61 KG/M2 | DIASTOLIC BLOOD PRESSURE: 80 MMHG | SYSTOLIC BLOOD PRESSURE: 104 MMHG

## 2019-05-17 DIAGNOSIS — R42 POSTURAL DIZZINESS WITH PRESYNCOPE: ICD-10-CM

## 2019-05-17 DIAGNOSIS — R55 POSTURAL DIZZINESS WITH PRESYNCOPE: ICD-10-CM

## 2019-05-17 DIAGNOSIS — Z87.898 HX OF SYNCOPE: Chronic | ICD-10-CM

## 2019-05-17 DIAGNOSIS — E87.6 HYPOKALEMIA: ICD-10-CM

## 2019-05-17 DIAGNOSIS — R73.9 HYPERGLYCEMIA: ICD-10-CM

## 2019-05-17 DIAGNOSIS — R55 CONVULSIVE SYNCOPE: ICD-10-CM

## 2019-05-17 DIAGNOSIS — R55 CONVULSIVE SYNCOPE: Primary | ICD-10-CM

## 2019-05-17 DIAGNOSIS — G40.909 RECURRENT SEIZURES (HCC): ICD-10-CM

## 2019-05-17 LAB
ALBUMIN SERPL-MCNC: 4.9 G/DL (ref 3.5–5.2)
ALP BLD-CCNC: 87 U/L (ref 40–129)
ALT SERPL-CCNC: 12 U/L (ref 0–40)
AMMONIA: 14.6 UMOL/L (ref 16–60)
ANION GAP SERPL CALCULATED.3IONS-SCNC: 11 MMOL/L (ref 7–16)
AST SERPL-CCNC: 21 U/L (ref 0–39)
BILIRUB SERPL-MCNC: 1 MG/DL (ref 0–1.2)
BUN BLDV-MCNC: 21 MG/DL (ref 6–20)
CALCIUM SERPL-MCNC: 9.7 MG/DL (ref 8.6–10.2)
CHLORIDE BLD-SCNC: 103 MMOL/L (ref 98–107)
CO2: 26 MMOL/L (ref 22–29)
CREAT SERPL-MCNC: 0.8 MG/DL (ref 0.7–1.2)
FOLATE: >20 NG/ML (ref 4.8–24.2)
GFR AFRICAN AMERICAN: >60
GFR NON-AFRICAN AMERICAN: >60 ML/MIN/1.73
GLUCOSE BLD-MCNC: 79 MG/DL (ref 74–99)
MAGNESIUM: 2.2 MG/DL (ref 1.6–2.6)
POTASSIUM SERPL-SCNC: 3.8 MMOL/L (ref 3.5–5)
SEDIMENTATION RATE, ERYTHROCYTE: 0 MM/HR (ref 0–15)
SODIUM BLD-SCNC: 140 MMOL/L (ref 132–146)
TOTAL CK: 188 U/L (ref 20–200)
TOTAL PROTEIN: 7.2 G/DL (ref 6.4–8.3)
TSH SERPL DL<=0.05 MIU/L-ACNC: 0.78 UIU/ML (ref 0.27–4.2)
VITAMIN B-12: 789 PG/ML (ref 211–946)

## 2019-05-17 PROCEDURE — 85651 RBC SED RATE NONAUTOMATED: CPT

## 2019-05-17 PROCEDURE — 84443 ASSAY THYROID STIM HORMONE: CPT

## 2019-05-17 PROCEDURE — 82550 ASSAY OF CK (CPK): CPT

## 2019-05-17 PROCEDURE — 80177 DRUG SCRN QUAN LEVETIRACETAM: CPT

## 2019-05-17 PROCEDURE — 80053 COMPREHEN METABOLIC PANEL: CPT

## 2019-05-17 PROCEDURE — 82140 ASSAY OF AMMONIA: CPT

## 2019-05-17 PROCEDURE — 82607 VITAMIN B-12: CPT

## 2019-05-17 PROCEDURE — 83735 ASSAY OF MAGNESIUM: CPT

## 2019-05-17 PROCEDURE — 36415 COLL VENOUS BLD VENIPUNCTURE: CPT

## 2019-05-17 PROCEDURE — 99204 OFFICE O/P NEW MOD 45 MIN: CPT | Performed by: PSYCHIATRY & NEUROLOGY

## 2019-05-17 PROCEDURE — 86592 SYPHILIS TEST NON-TREP QUAL: CPT

## 2019-05-17 PROCEDURE — 82746 ASSAY OF FOLIC ACID SERUM: CPT

## 2019-05-17 RX ORDER — LEVETIRACETAM 500 MG/1
TABLET, EXTENDED RELEASE ORAL
Qty: 60 TABLET | Refills: 5 | Status: SHIPPED
Start: 2019-05-17 | End: 2021-01-11 | Stop reason: DRUGHIGH

## 2019-05-17 ASSESSMENT — ENCOUNTER SYMPTOMS
ALLERGIC/IMMUNOLOGIC NEGATIVE: 1
RESPIRATORY NEGATIVE: 1
SORE THROAT: 0
NAUSEA: 0
VOMITING: 0
GASTROINTESTINAL NEGATIVE: 1
EYES NEGATIVE: 1
COLOR CHANGE: 0
COUGH: 0
SHORTNESS OF BREATH: 0
ABDOMINAL PAIN: 0

## 2019-05-17 NOTE — PROGRESS NOTES
Neurology Consult Note:    Patient: Iris Bence  : 1990  Date: 19  Referring provider: Janette Bolanos MD    Referral to Neurology: Recurrent generalized seizures versus convulsive syncope    Cc: Recurrent seizures    Dear Janette Bolanos MD       Thank you for your referral of Iris Bence to the Neurology clinic, an alert, 65-year-old gentleman with history of recurrent generalized seizures versus convulsive syncope. Medical consultations and records are reviewed from 19, ED evaluation for recurrent generalized seizure x 2-3 mins. With generalized shaking noted his parents continuing Keppra 500 mg XR daily. His blood glucose was elevated to a value of 148 and potassium 3.2. Neurology consultations on 18 with Keanu PETERSON for recurrent seizure, syncope, continuing Keppra  mg daily; and Dr. Rosas Brenner, 16, for convulsive syncope preceded by lightheadedness; reported no family history of seizure no prior history of seizure disorder was noted. He has had negative tilt table testing and negative syncope evaluation in the past. He has also seen a cardiologist in the past whom he explains ruled out a cardiac etiology. Three syncopal episode occurred while seated at his kitchen at home, preceded by lightheadedness and dizziness briefly, and the most recent event on , where he also describes seeing flashing red and blue lights, which has been described as a visual aura before, but he has no history of migraines and reports no prior history of seizures before the onset in 2016. He also reports he was seen at [facility], [place] in 2016 for syncope versus seizure when he fell off of a stool followed by generalized shaking. He denies a history of tongue biting or urinary incontinence in association. There is no family history of epilepsy. He recalls being told he had a normal EEG.     Lab Data: Reviewed from 19 and 19, elevated blood glucose, potassium 3.2, status: Never Smoker    Smokeless tobacco: Never Used   Substance and Sexual Activity    Alcohol use: Yes     Comment: occasional     Drug use: No    Sexual activity: Not on file   Lifestyle    Physical activity:     Days per week: Not on file     Minutes per session: Not on file    Stress: Not on file   Relationships    Social connections:     Talks on phone: Not on file     Gets together: Not on file     Attends Congregational service: Not on file     Active member of club or organization: Not on file     Attends meetings of clubs or organizations: Not on file     Relationship status: Not on file    Intimate partner violence:     Fear of current or ex partner: Not on file     Emotionally abused: Not on file     Physically abused: Not on file     Forced sexual activity: Not on file   Other Topics Concern    Not on file   Social History Narrative    Not on file     Review of Systems   Constitutional: Negative. HENT: Negative. Eyes: Negative. Respiratory: Negative. Cardiovascular: Negative. Gastrointestinal: Negative. Endocrine: Negative. Genitourinary: Negative. Musculoskeletal: Negative. Skin: Negative. Allergic/Immunologic: Negative. Neurological: Positive for dizziness, seizures, syncope and light-headedness. Hematological: Negative. Psychiatric/Behavioral: Negative for decreased concentration. The patient is nervous/anxious. All other systems reviewed and are negative. Neurologic Exam:  /80 (Site: Right Upper Arm, Position: Sitting, Cuff Size: Medium Adult)   Ht 5' 10\" (1.778 m)   Wt 130 lb (59 kg)   BMI 18.65 kg/m²   General appearance: Alert, Anxious, thin, well-groomed, seated in the exam room in the company of his father, no acute distress  HEENT: Normocephalic/atraumatic.   Neck: Supple, no bruits are adventitious sounds  Cardiac: RRR, S1 and S2 within normal limits  Respiratory: grossly clear  Extremities: No edema, erythema  Skin: No lesions or rashes  Musculoskeletal: No fasciculations or tremors  Mental Status: Alert, oriented ×3  Speech/Language: Clear, grossly fluent  Attention span/Concentration: Grossly intact  Affect/Mood: Mildly anxious  Insight/Judgement: Appears grossly intact     Fund of Knowledge/Current events: Grossly intact  CN II-XII:     Pupils: Equal, reactive to light, 1.5 mm     EOM's: Full without nystagmus    Visual Fields: Full to confrontation    Fundi: Grossly unremarkable, miosis from light  CN V: normal V1-V3  CN VII: No facial droop, symmetric smile   CN VIII: Hearing grossly intact   CN IX-XII: No palatal asymmetry, tongue midline  SCM/Trapezii: 5/5 power  Motor: 5/5 power in the upper and lower extremities without tremor or drift and normal motor tone without cogwheeling or spasticity, intact fine motor function of both hands, symmetric. DTR's: 2+ and symmetric in the upper and lower extremities, no ankle clonus, plantar responses are flexor  Sensory: Grossly intact seductive sensation to light touch and sharp stick testing  Coordination/Gait: No gross limb dysmetria, truncal or cerebellar gait ataxia, normal tandem gait. Assessment/Plan:  1. Recurrent convulsive syncope, vasovagal (neurocardiogenic) or situational syncope versus recurrent generalized seizures  2. Generalized anxiety  3. Stable, nonfocal neurologic exam  4. 2nd opinion referral is made to the [facility] Department of Epilepsy for consultation and video-EEG study for diagnostic purposes and recommendations regarding medication management. 5. He is recommended to increase Keppra  mg to 1 tablet twice daily. 6. Follow-up in the Neurology clinic on a when necessary basis in 3 months if clinically indicated. He was provided patient information on the topics of syncope and seizures. 7. No driving policy per the [facility] BMV also discussed, minimum of 6-12 months and must remain clinically stable without recurrent spells.   8. Additional lab tests are ordered and specified below. Sincerely,      Florinda Small MD    This note was created using speech recognition transcription software. Despite proofreading, there may be several typographical errors present that may affect the meaning of the content. Please call with any questions. Note: More than 50% of this 40-minute face-face visit time included counseling and coordination of care based on clinical impression, review of test results, treatment plan, risk factor reduction and patient and/or family education.     Orders Placed This Encounter   Procedures    Magnesium     Standing Status:   Future     Standing Expiration Date:   5/17/2020    Comprehensive Metabolic Panel     Standing Status:   Future     Standing Expiration Date:   5/17/2020    Sedimentation Rate     Standing Status:   Future     Standing Expiration Date:   5/17/2020    RPR Reflex to Titer and TPPA     Standing Status:   Future     Standing Expiration Date:   5/17/2020    TSH without Reflex     Standing Status:   Future     Standing Expiration Date:   5/17/2020    Vitamin B12 & Folate     Standing Status:   Future     Standing Expiration Date:   5/17/2020    Ammonia     Standing Status:   Future     Standing Expiration Date:   5/17/2020    Levetiracetam Level     Standing Status:   Future     Standing Expiration Date:   5/17/2020    CK     Standing Status:   Future     Standing Expiration Date:   5/17/2020    External Referral To Neurology     Referral Priority:   Routine     Referral Type:   Eval and Treat     Referral Reason:   Specialty Services Required     Referral Location:   [facility]     Requested Specialty:   Neurology     Number of Visits Requested:   1

## 2019-05-18 LAB — KEPPRA: 89 UG/ML (ref 12–46)

## 2019-05-19 LAB — KEPPRA: 13 UG/ML (ref 12–46)

## 2019-05-20 LAB — RPR: NORMAL

## 2019-07-03 ENCOUNTER — TELEPHONE (OUTPATIENT)
Dept: NEUROLOGY | Age: 29
End: 2019-07-03

## 2019-07-22 ENCOUNTER — OFFICE VISIT (OUTPATIENT)
Dept: PRIMARY CARE CLINIC | Age: 29
End: 2019-07-22
Payer: COMMERCIAL

## 2019-07-22 VITALS
HEIGHT: 69 IN | DIASTOLIC BLOOD PRESSURE: 58 MMHG | HEART RATE: 71 BPM | WEIGHT: 130.8 LBS | TEMPERATURE: 98 F | RESPIRATION RATE: 18 BRPM | SYSTOLIC BLOOD PRESSURE: 102 MMHG | OXYGEN SATURATION: 99 % | BODY MASS INDEX: 19.37 KG/M2

## 2019-07-22 DIAGNOSIS — R56.9 SEIZURE (HCC): Primary | ICD-10-CM

## 2019-07-22 PROCEDURE — 99213 OFFICE O/P EST LOW 20 MIN: CPT | Performed by: FAMILY MEDICINE

## 2019-07-22 NOTE — PROGRESS NOTES
alert and oriented x 3, NAD, pleasant  HEENT:  Atraumatic, normocephalic, pupils equal and normal, EOMI, clear conjunctiva  Neck:  Supple  Lungs:  no resp distress  Extremities:  No clubbing, cyanosis or edema  Neuro: normal gait, normal speech  Skin: unremarkable    Assessment/Plan:    Rebecca Jenkins was seen today for discuss labs. Diagnoses and all orders for this visit:    Seizure [facility])  Long discussion about current therapy and plan of care. Recommended continuing with the increase in Keppra dosing and to give it some time. Discussed reasons for medication changes--unable to tolerate med or has breakthrough seizures. Encouraged him to continue with [facility] neurology. If further lab/vitamin evaluation needs to be conducted, it will eventually become apparent. Patient agreeable. As above. Call or go to ED immediately if symptoms worsen or persist.  Return in about 6 months (around 1/22/2020). , or sooner if necessary. Spent over 15 minutes with patient of which greater than 50% was spent counseling regarding the above issues. Educational materials and/or home exercises printed for patient's review and were included in patient instructions on his/her After Visit Summary and given to patient at the end of visit. Counseled regarding above diagnosis, including possible risks and complications,  especially if left uncontrolled. Counseled regarding the possible side effects, risks, benefits and alternatives to treatment; patient and/or guardian verbalizes understanding, agrees, feels comfortable with and wishes to proceed with above treatment plan. Advised patient to call with any new medication issues, and read all Rx info from pharmacy to assure aware of all possible risks and side effects of medication before taking. Reviewed age and gender appropriate health screening exams and vaccinations.   Advised patient regarding importance of keeping up with recommended health maintenance and to schedule as soon as possible if overdue, as this is important in assessing for undiagnosed pathology, especially cancer, as well as protecting against potentially harmful/life threatening disease. Patient and/or guardian verbalizes understanding and agrees with above counseling, assessment and plan. All questions answered.

## 2019-08-19 ENCOUNTER — OFFICE VISIT (OUTPATIENT)
Dept: NEUROLOGY | Age: 29
End: 2019-08-19
Payer: COMMERCIAL

## 2019-08-19 VITALS
DIASTOLIC BLOOD PRESSURE: 70 MMHG | BODY MASS INDEX: 19.26 KG/M2 | WEIGHT: 130 LBS | HEART RATE: 60 BPM | SYSTOLIC BLOOD PRESSURE: 108 MMHG | HEIGHT: 69 IN

## 2019-08-19 DIAGNOSIS — G40.109 PARTIAL SEIZURE DISORDER (HCC): Primary | ICD-10-CM

## 2019-08-19 PROCEDURE — 99213 OFFICE O/P EST LOW 20 MIN: CPT | Performed by: PSYCHIATRY & NEUROLOGY

## 2019-08-19 RX ORDER — MULTIVITAMIN WITH IRON
100 TABLET ORAL DAILY
COMMUNITY
Start: 2019-08-06

## 2019-08-19 ASSESSMENT — ENCOUNTER SYMPTOMS
RESPIRATORY NEGATIVE: 1
GASTROINTESTINAL NEGATIVE: 1
ALLERGIC/IMMUNOLOGIC NEGATIVE: 1

## 2019-08-19 NOTE — PROGRESS NOTES
Neurology Progress Note, Follow-up:    Patient: Judah Munoz  : 1990  Date: 19  Primary provider: Ken Barrientos MD     Re: Followup, partial onset seizure disorder      Dear Ken Barrientos MD    I have seen Lizzie Barcenas for follow-up office visit that was prescheduled in advance of his [address] [place] [address] Epilepsy appointments. I reviewed the chart record with his epilepsy neurologist, Dr. Surya Miner, whom he saw . His Keppra XR medication was advanced and was recommended also to be taking 1000 mg twice daily as well as pyridoxine 100 mg daily. He explains that he was unable to tolerate a higher dosage of Keppra and experienced a hallucination so continues three  500 mg tablets of Keppra  XR daily without recurrent events. He underwent a 75-minute EEG which was within normal limits showing no epileptiform discharges or EEG seizures. He was recommended to follow-up with Dr. Surya Miner at periodic intervals and reports he has a virtual teleconference arranged with Dr. Surya Miner in November. He and his father believes that many of his \"seizures\" are related to severe stress and anxiety. Please refer to the prior Neurology consult note of Dr. Jessi Ward, 2019 for additional information if needed.     Current Outpatient Medications   Medication Sig Dispense Refill    vitamin B-6 (PYRIDOXINE) 100 MG tablet Take 100 mg by mouth daily      levETIRAcetam (KEPPRA XR) 500 MG TB24 extended release tablet Take one twice daily (Patient taking differently: 500 mg 3 times daily Take one tablet every morning and two every evening) 60 tablet 5    VALERIAN ROOT PO Take by mouth      Potassium 99 MG TABS Take by mouth daily      MAGNESIUM-POTASSIUM PO Take by mouth daily      Cholecalciferol (VITAMIN D3) 1000 units CAPS Take by mouth      Ascorbic Acid (VITAMIN C ADULT GUMMIES PO) Take by mouth      Multiple Vitamins-Minerals (MULTI COMPLETE PO) Take by mouth      Garlic 6921 MG CAPS Take by mouth       No current facility-administered medications for this visit. Allergies   Allergen Reactions    Red Dye Nausea And Vomiting    Sulfa Antibiotics Rash       Patient Active Problem List   Diagnosis    Convulsive syncope    Hx of seizure disorder    Hx of syncope    Postural dizziness with presyncope    Hyperglycemia       Past Medical History:   Diagnosis Date    Hx of syncope 5/17/2019    Hyperglycemia 5/17/2019    Postural dizziness with presyncope 5/17/2019    History, syncope preceded by dizziness/lightheadedness, Neuro. Consult, Dr. Justen Mcgovern, 4/7/16    Seiz[facility] ([facility] [address]     Syncope and collapse        No past surgical history on file.     Family History   Problem Relation Age of Onset    Heart Disease Mother        Social History     Socioeconomic History    Marital status: Single     Spouse name: Not on file    Number of children: Not on file    Years of education: Not on file    Highest education level: Not on file   Occupational History    Not on file   Social Needs    Financial resource strain: Not on file    Food insecurity:     Worry: Not on file     Inability: Not on file    Transportation needs:     Medical: Not on file     Non-medical: Not on file   Tobacco Use    Smoking status: Never Smoker    Smokeless tobacco: Never Used   Substance and Sexual Activity    Alcohol use: Yes     Comment: occasional     Drug use: No    Sexual activity: Not on file   Lifestyle    Physical activity:     Days per week: Not on file     Minutes per session: Not on file    Stress: Not on file   Relationships    Social connections:     Talks on phone: Not on file     Gets together: Not on file     Attends Hinduism service: Not on file     Active member of club or organization: Not on file     Attends meetings of clubs or organizations: Not on file     Relationship status: Not on file    Intimate partner violence:     Fear of current or ex partner: Not on file     Emotionally abused: Not on file     Physically abused: Not on file     Forced sexual activity: Not on file   Other Topics Concern    Not on file   Social History Narrative    Not on file     Review of Systems   Constitutional: Negative. HENT: Negative. Eyes: Positive for visual disturbance. Respiratory: Negative. Cardiovascular: Negative. Gastrointestinal: Negative. Endocrine: Negative. Genitourinary: Negative. Musculoskeletal: Negative. Skin: Negative. Allergic/Immunologic: Negative. Neurological: Positive for seizures. Hematological: Negative. Psychiatric/Behavioral: The patient is nervous/anxious. All other systems reviewed and are negative. Neurologic Exam:  /70 (Site: Right Upper Arm, Position: Sitting, Cuff Size: Medium Adult)   Pulse 60   Ht 5' 9\" (1.753 m)   Wt 130 lb (59 kg)   BMI 19.20 kg/m²    Mental Status: Alert, fully oriented at baseline. Speech is clear and language fluent. There are no paraphasic word errors. Attention span and concentration ability are grossly intact. Affect is mildly constricted and mood is moderately anxious. Insight/judgment: Fairly good. CN's II-XII: Remains grossly intact throughout. Pupils are equal and reactive to light. EOMs are intact without nystagmus. Visual fields are full. Facial expression and sensation are normal and symmetric. Hearing is grossly intact. The tongue is midline. Motor/Sensory Exam: Grossly intact 5/5 power in the upper and lower extremities without tremor or drift and normal motor tone. Intact fine motor function of both hands, symmetric. No pathologic reflexes. Sensory modalities are reported is grossly intact subjectively. Coordination/Gait: No gross limb dysmetria on finger-to-nose testing, no truncal or cerebellar gait ataxia. Normal gait function. Assessment/Plan:   1. Possible focal onset seizure disorder vs PNES, or combination of both. 2.  Generalized anxiety.   3.  He is continuing

## 2020-01-22 ENCOUNTER — OFFICE VISIT (OUTPATIENT)
Dept: PRIMARY CARE CLINIC | Age: 30
End: 2020-01-22
Payer: COMMERCIAL

## 2020-01-22 VITALS
HEIGHT: 69 IN | DIASTOLIC BLOOD PRESSURE: 58 MMHG | RESPIRATION RATE: 18 BRPM | BODY MASS INDEX: 19.61 KG/M2 | WEIGHT: 132.4 LBS | SYSTOLIC BLOOD PRESSURE: 100 MMHG | HEART RATE: 82 BPM | OXYGEN SATURATION: 98 % | TEMPERATURE: 98 F

## 2020-01-22 PROBLEM — G40.109 PARTIAL SEIZURE DISORDER (HCC): Status: ACTIVE | Noted: 2020-01-22

## 2020-01-22 PROBLEM — G40.109 FOCAL EPILEPSY (HCC): Status: ACTIVE | Noted: 2020-01-22

## 2020-01-22 PROCEDURE — 90686 IIV4 VACC NO PRSV 0.5 ML IM: CPT | Performed by: FAMILY MEDICINE

## 2020-01-22 PROCEDURE — 90471 IMMUNIZATION ADMIN: CPT | Performed by: FAMILY MEDICINE

## 2020-01-22 PROCEDURE — 99213 OFFICE O/P EST LOW 20 MIN: CPT | Performed by: FAMILY MEDICINE

## 2020-01-22 ASSESSMENT — PATIENT HEALTH QUESTIONNAIRE - PHQ9
1. LITTLE INTEREST OR PLEASURE IN DOING THINGS: 0
SUM OF ALL RESPONSES TO PHQ9 QUESTIONS 1 & 2: 0
SUM OF ALL RESPONSES TO PHQ QUESTIONS 1-9: 0
2. FEELING DOWN, DEPRESSED OR HOPELESS: 0
SUM OF ALL RESPONSES TO PHQ QUESTIONS 1-9: 0

## 2020-12-09 ENCOUNTER — NURSE TRIAGE (OUTPATIENT)
Dept: OTHER | Facility: CLINIC | Age: 30
End: 2020-12-09

## 2020-12-09 NOTE — TELEPHONE ENCOUNTER
Reason for Disposition   [1] COVID-19 diagnosed by positive lab test AND [2] mild symptoms (e.g., cough, fever, others) AND [7] no complications or SOB    Answer Assessment - Initial Assessment Questions  1. COVID-19 DIAGNOSIS: \"Who made your Coronavirus (COVID-19) diagnosis? \" \"Was it confirmed by a positive lab test?\" If not diagnosed by a HCP, ask \"Are there lots of cases (community spread) where you live? \" (See public health department website, if unsure)     Not tested    2. COVID-19 EXPOSURE: \"Was there any known exposure to COVID before the symptoms began? \" CDC Definition of close contact: within 6 feet (2 meters) for a total of 15 minutes or more over a 24-hour period. Unsure    3. ONSET: \"When did the COVID-19 symptoms start?\"       1 week    4. WORST SYMPTOM: \"What is your worst symptom? \" (e.g., cough, fever, shortness of breath, muscle aches)      Diarrhea    5. COUGH: \"Do you have a cough? \" If so, ask: \"How bad is the cough? \"        Mild cough    6. FEVER: \"Do you have a fever? \" If so, ask: \"What is your temperature, how was it measured, and when did it start? \"      Denies    7. RESPIRATORY STATUS: \"Describe your breathing? \" (e.g., shortness of breath, wheezing, unable to speak)       Denies    8. BETTER-SAME-WORSE: Isaias Gut you getting better, staying the same or getting worse compared to yesterday? \"  If getting worse, ask, \"In what way? \"      Better    9. HIGH RISK DISEASE: \"Do you have any chronic medical problems? \" (e.g., asthma, heart or lung disease, weak immune system, obesity, etc.)      Denies    10. PREGNANCY: \"Is there any chance you are pregnant? \" \"When was your last menstrual period? \"          11. OTHER SYMPTOMS: \"Do you have any other symptoms? \"  (e.g., chills, fatigue, headache, loss of smell or taste, muscle pain, sore throat; new loss of smell or taste especially support the diagnosis of COVID-19)        Diarrhea, cough    Protocols used: CORONAVIRUS (COVID-19) DIAGNOSED OR [facility]  Call received through Covid-19 hot line. Discussed using [employer] virtual visit, coupon code: UCEG9379. Assisted patient in finding flu clinic in her area. Recommend using the [place] Department of Health's web site for testing locations and information. Attention Provider: Thank you for allowing me to participate in the care of your patient. Please do not respond through this encounter as the response is not directed to a shared pool.     Recommended to contact PCP for further information or local [facility] health

## 2021-01-11 ENCOUNTER — OFFICE VISIT (OUTPATIENT)
Dept: FAMILY MEDICINE CLINIC | Age: 31
End: 2021-01-11
Payer: COMMERCIAL

## 2021-01-11 VITALS
TEMPERATURE: 97 F | HEART RATE: 86 BPM | BODY MASS INDEX: 18.99 KG/M2 | DIASTOLIC BLOOD PRESSURE: 76 MMHG | RESPIRATION RATE: 18 BRPM | SYSTOLIC BLOOD PRESSURE: 119 MMHG | OXYGEN SATURATION: 99 % | WEIGHT: 128.2 LBS | HEIGHT: 69 IN

## 2021-01-11 DIAGNOSIS — D22.9 MULTIPLE NEVI: ICD-10-CM

## 2021-01-11 DIAGNOSIS — Z13.220 SCREENING FOR CHOLESTEROL LEVEL: ICD-10-CM

## 2021-01-11 DIAGNOSIS — R73.09 ELEVATED GLUCOSE: ICD-10-CM

## 2021-01-11 DIAGNOSIS — G40.109 FOCAL EPILEPSY (HCC): Primary | ICD-10-CM

## 2021-01-11 DIAGNOSIS — Z13.1 SCREENING FOR DIABETES MELLITUS: ICD-10-CM

## 2021-01-11 PROCEDURE — 99214 OFFICE O/P EST MOD 30 MIN: CPT | Performed by: FAMILY MEDICINE

## 2021-01-11 RX ORDER — LEVETIRACETAM 500 MG/1
1000 TABLET, EXTENDED RELEASE ORAL 2 TIMES DAILY
COMMUNITY
Start: 2020-09-17

## 2021-01-11 ASSESSMENT — PATIENT HEALTH QUESTIONNAIRE - PHQ9
SUM OF ALL RESPONSES TO PHQ QUESTIONS 1-9: 0
1. LITTLE INTEREST OR PLEASURE IN DOING THINGS: 0
2. FEELING DOWN, DEPRESSED OR HOPELESS: 0

## 2021-01-11 NOTE — PROGRESS NOTES
Subjective:  31 y.o. y/o male is here for follow up seizure d/o. Seen at [facility] 9/20, Dr. 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 Medications and allergies also reviewed and updated in chart.         Review of Systems:  Constitutional:  No fever, no fatigue, no chills, + headaches, no weight change  Dermatology:  + rash, + mole, + dry or sensitive skin, +nervously bites skin on knuckles  ENT:  No cough, no sore throat, no sinus pain, no runny nose, no ear pain  Cardiology:  No chest pain, no palpitations, no leg edema, no shortness of breath, no PND  Gastroenterology:  No dysphagia, no abdominal pain, no nausea, no vomiting, no constipation, no diarrhea, no heartburn  Musculoskeletal:  No joint pain, no leg cramps, no back pain, no muscle aches  Neuro:  No dizziness, no numbness/tingling  Respiratory:  No shortness of breath, no orthopnea, no wheezing, no CESAR  Urology:  No blood in the urine, no urinary frequency, no urinary incontinence, no urinary urgency, no nocturia, no dysuria    Vitals:    01/11/21 1534   BP: 119/76   Pulse: 86   Resp: 18   Temp: 97 °F (36.1 °C)   TempSrc: Temporal   SpO2: 99%   Weight: 128 lb 3.2 oz (58.2 kg)   Height: 5' 9\" (1.753 m)     Body mass index is 18.93 kg/m². General:  Patient alert and oriented x 3, NAD, pleasant, slightly anxious  HEENT:  Atraumatic, normocephalic, pupils equal and normal, EOMI, clear conjunctiva, +mask  Neck:  Supple, no LAD, no goiter  Lungs:  CTAB, no resp distress  Heart: RRR, no m/r/g  Abd: S/NT/ND, +bs  Extremities:  No clubbing, cyanosis or edema  Neuro: normal gait, normal speech, 5/5 strength UE and LE b/l, normal DTRs  Skin: right PIP joints enlarged with scaly rash over them    Assessment/Plan:    Citlali Baird was seen today for seizures. Diagnoses and all orders for this visit:    Focal epilepsy ([facility] [address] no seizure in 9 months  -     CBC Auto Differential; Future  -     TSH without Reflex; Future  + Continue current Keppra  + F/u with neurology [facility] as directed    Multiple nevi  -     Sandor Damon MD,  Dermatology, [facility]    Screening for cholesterol level  -     Lipid Panel; Future    Screening for diabetes mellitus  -     Hemoglobin A1C; Future  -     Comprehensive Metabolic Panel; Future    Elevated glucose  -     Hemoglobin A1C; Future      Return for fasting lab. As above. Call or go to ED immediately if symptoms worsen or persist.  Return in about 6 months (around 7/11/2021). , or sooner if necessary. Educational materials and/or home exercises printed for patient's review and were included in patient instructions on his/her After Visit Summary and given to patient at the end of visit. Counseled regarding above diagnosis, including possible risks and complications,  especially if left uncontrolled.     Counseled regarding the possible side effects, risks, benefits and alternatives to treatment; patient and/or guardian verbalizes understanding, agrees, feels comfortable with and wishes to proceed with above treatment plan. Advised patient to call with any new medication issues, and read all Rx info from pharmacy to assure aware of all possible risks and side effects of medication before taking. Reviewed age and gender appropriate health screening exams and vaccinations. Advised patient regarding importance of keeping up with recommended health maintenance and to schedule as soon as possible if overdue, as this is important in assessing for undiagnosed pathology, especially cancer, as well as protecting against potentially harmful/life threatening disease. Patient and/or guardian verbalizes understanding and agrees with above counseling, assessment and plan. All questions answered.

## 2021-01-20 ENCOUNTER — NURSE ONLY (OUTPATIENT)
Dept: FAMILY MEDICINE CLINIC | Age: 31
End: 2021-01-20
Payer: COMMERCIAL

## 2021-01-20 DIAGNOSIS — Z13.220 SCREENING FOR CHOLESTEROL LEVEL: ICD-10-CM

## 2021-01-20 DIAGNOSIS — Z13.1 SCREENING FOR DIABETES MELLITUS: ICD-10-CM

## 2021-01-20 DIAGNOSIS — R55 POSTURAL DIZZINESS WITH PRESYNCOPE: ICD-10-CM

## 2021-01-20 DIAGNOSIS — R73.9 HYPERGLYCEMIA: ICD-10-CM

## 2021-01-20 DIAGNOSIS — G40.109 FOCAL EPILEPSY (HCC): ICD-10-CM

## 2021-01-20 DIAGNOSIS — R73.09 ELEVATED GLUCOSE: ICD-10-CM

## 2021-01-20 DIAGNOSIS — R42 POSTURAL DIZZINESS WITH PRESYNCOPE: ICD-10-CM

## 2021-01-20 LAB
ALBUMIN SERPL-MCNC: 4.9 G/DL (ref 3.5–5.2)
ALP BLD-CCNC: 77 U/L (ref 40–129)
ALT SERPL-CCNC: 15 U/L (ref 0–40)
ANION GAP SERPL CALCULATED.3IONS-SCNC: 15 MMOL/L (ref 7–16)
AST SERPL-CCNC: 24 U/L (ref 0–39)
BASOPHILS ABSOLUTE: 0.04 E9/L (ref 0–0.2)
BASOPHILS RELATIVE PERCENT: 0.7 % (ref 0–2)
BILIRUB SERPL-MCNC: 1 MG/DL (ref 0–1.2)
BUN BLDV-MCNC: 11 MG/DL (ref 6–20)
CALCIUM SERPL-MCNC: 10 MG/DL (ref 8.6–10.2)
CHLORIDE BLD-SCNC: 103 MMOL/L (ref 98–107)
CHOLESTEROL, TOTAL: 156 MG/DL (ref 0–199)
CO2: 23 MMOL/L (ref 22–29)
CREAT SERPL-MCNC: 0.8 MG/DL (ref 0.7–1.2)
EOSINOPHILS ABSOLUTE: 0.14 E9/L (ref 0.05–0.5)
EOSINOPHILS RELATIVE PERCENT: 2.3 % (ref 0–6)
GFR AFRICAN AMERICAN: >60
GFR NON-AFRICAN AMERICAN: >60 ML/MIN/1.73
GLUCOSE BLD-MCNC: 88 MG/DL (ref 74–99)
HBA1C MFR BLD: 5 % (ref 4–5.6)
HCT VFR BLD CALC: 48.9 % (ref 37–54)
HDLC SERPL-MCNC: 79 MG/DL
HEMOGLOBIN: 16.6 G/DL (ref 12.5–16.5)
IMMATURE GRANULOCYTES #: 0.01 E9/L
IMMATURE GRANULOCYTES %: 0.2 % (ref 0–5)
LDL CHOLESTEROL CALCULATED: 63 MG/DL (ref 0–99)
LYMPHOCYTES ABSOLUTE: 2.28 E9/L (ref 1.5–4)
LYMPHOCYTES RELATIVE PERCENT: 37.6 % (ref 20–42)
MCH RBC QN AUTO: 31.9 PG (ref 26–35)
MCHC RBC AUTO-ENTMCNC: 33.9 % (ref 32–34.5)
MCV RBC AUTO: 93.9 FL (ref 80–99.9)
MONOCYTES ABSOLUTE: 0.51 E9/L (ref 0.1–0.95)
MONOCYTES RELATIVE PERCENT: 8.4 % (ref 2–12)
NEUTROPHILS ABSOLUTE: 3.09 E9/L (ref 1.8–7.3)
NEUTROPHILS RELATIVE PERCENT: 50.8 % (ref 43–80)
PDW BLD-RTO: 13.2 FL (ref 11.5–15)
PLATELET # BLD: 248 E9/L (ref 130–450)
PMV BLD AUTO: 9.4 FL (ref 7–12)
POTASSIUM SERPL-SCNC: 4.3 MMOL/L (ref 3.5–5)
RBC # BLD: 5.21 E12/L (ref 3.8–5.8)
SODIUM BLD-SCNC: 141 MMOL/L (ref 132–146)
TOTAL PROTEIN: 7.3 G/DL (ref 6.4–8.3)
TRIGL SERPL-MCNC: 68 MG/DL (ref 0–149)
TSH SERPL DL<=0.05 MIU/L-ACNC: 2.71 UIU/ML (ref 0.27–4.2)
VLDLC SERPL CALC-MCNC: 14 MG/DL
WBC # BLD: 6.1 E9/L (ref 4.5–11.5)

## 2021-01-20 PROCEDURE — 36415 COLL VENOUS BLD VENIPUNCTURE: CPT | Performed by: FAMILY MEDICINE

## 2021-06-04 ENCOUNTER — OFFICE VISIT (OUTPATIENT)
Dept: FAMILY MEDICINE CLINIC | Age: 31
End: 2021-06-04
Payer: COMMERCIAL

## 2021-06-04 VITALS
WEIGHT: 128.6 LBS | SYSTOLIC BLOOD PRESSURE: 131 MMHG | TEMPERATURE: 100.9 F | RESPIRATION RATE: 18 BRPM | HEIGHT: 68 IN | DIASTOLIC BLOOD PRESSURE: 75 MMHG | HEART RATE: 98 BPM | OXYGEN SATURATION: 99 % | BODY MASS INDEX: 19.49 KG/M2

## 2021-06-04 DIAGNOSIS — R56.9 SEIZURE (HCC): Primary | ICD-10-CM

## 2021-06-04 PROBLEM — R73.9 HYPERGLYCEMIA: Status: RESOLVED | Noted: 2019-05-17 | Resolved: 2021-06-04

## 2021-06-04 PROCEDURE — 99213 OFFICE O/P EST LOW 20 MIN: CPT | Performed by: STUDENT IN AN ORGANIZED HEALTH CARE EDUCATION/TRAINING PROGRAM

## 2021-06-04 NOTE — PROGRESS NOTES
[address]  Precepting Note    Subjective:  30 yo M with h/o epilepsy for which he follows with neurology at [facility]  Takes keppra 1000 mg bid   Has had several more frequent episodes over the last 3 months - a total of 3 episodes. Symptoms include LOC, tongue biting, groggy after medications  Has seen different neurologists    ROS otherwise negative     Past medical, surgical, family and social history were reviewed, non-contributory, and unchanged unless otherwise stated. Objective:    /75 (Site: Right Upper Arm, Position: Sitting, Cuff Size: Small Adult)   Pulse 98   Temp 100.9 °F (38.3 °C) (Temporal)   Resp 18   Ht 5' 8\" (1.727 m)   Wt 128 lb 9.6 oz (58.3 kg)   SpO2 99%   BMI 19.55 kg/m²     Exam is as noted by resident   Assessment/Plan:  Seizure disorder, poor control despite Keppra, needs f/u with neurology  Consider referral for counseling to address stressors, managing chronic illness       Attending Physician Statement  I have reviewed the chart, including any radiology or labs. I have discussed the case, including pertinent history and exam findings with the resident. I agree with the assessment, plan and orders as documented by the resident. Please refer to the resident note for additional information.       Electronically signed by Morales Sykes MD on 6/4/2021 at 3:46 PM

## 2021-06-04 NOTE — PROGRESS NOTES
Patient:  Jaya Morales 31 y.o. male     Date of Service: 6/4/21      Chiefcomplaint:   Chief Complaint   Patient presents with    Seizures     6 mth f/up     History of Present Illness     Patient has been diagnosed with epilepsy in the past but he is uncertain if this is the correct diagnosis. He has followed with multiple neurologists but wonders about other possible etiologies for his seizures including hypoglycemia. He is wondering about a referral to endocrine. Last neuro appointment was with [facility] about 9 months ago. Was supposed to have fu in June but he canceled it  5 year history of seizure  On keppra 1000mg bid. Denies missing doses. Had 3 episodes in the last couple months which is a significant increase from the past.  Always convulsion, loc, less than 5 min, tongue biting, feels groggy afterwards    Pertinent Medical, Family, Surgical, Social History:  Past Medical History:   Diagnosis Date    Hx of syncope 5/17/2019    Hyperglycemia 5/17/2019    Postural dizziness with presyncope 5/17/2019    History, syncope preceded by dizziness/lightheadedness, Neuro. Consult, Dr. Heaven Ellis, 4/7/16    Seizure ([address]     Syncope and collapse      Physical Exam   Vitals: /75 (Site: Right Upper Arm, Position: Sitting, Cuff Size: Small Adult)   Pulse 98   Temp 100.9 °F (38.3 °C) (Temporal)   Resp 18   Ht 5' 8\" (1.727 m)   Wt 128 lb 9.6 oz (58.3 kg)   SpO2 99%   BMI 19.55 kg/m²   General Appearance: Alert, oriented, no acute distress  HEENT: No scleral icterus. No visible discharge from eyes  Neck: Not rigid. No visible masses  Chest wall/Lung: Clear to auscultation bilaterally,  respirations unlabored. No ronchi/wheezing/rales  Heart: RRR, no murmur  Abdomen: Soft, nontender  Extremities:  No edema  Skin: No rashes. No jaundice  Neuro: Alert and oriented        Psych: Appropriate mood and appropriate affect    Assessment and Plan     1.  Seizure ([address]  Seems to be a worsening of chronic epilepsy more recently. Recommended patient follow up with neuro again in [place] and discuss his concerns. There seems to be a significant amoutn of stress about his diagnosis and recommend that he consider some form of talk therapy in addition to further discussions with neurology. I don't believe an endocrine referral is warranted at this time. Return to Office: No follow-ups on file. Rosana Starkey, DO       This document may have been prepared at least partially through the use of voice recognition software. Although effort is taken to assure the accuracy of this document, it is possible that grammatical, syntax,  or spelling errors may occur.

## 2021-06-15 ENCOUNTER — TELEPHONE (OUTPATIENT)
Dept: ADMINISTRATIVE | Age: 31
End: 2021-06-15

## 2025-01-06 ENCOUNTER — TELEPHONE (OUTPATIENT)
Dept: FAMILY MEDICINE CLINIC | Age: 35
End: 2025-01-06

## 2025-03-17 ENCOUNTER — OFFICE VISIT (OUTPATIENT)
Dept: FAMILY MEDICINE CLINIC | Age: 35
End: 2025-03-17
Payer: COMMERCIAL

## 2025-03-17 VITALS
SYSTOLIC BLOOD PRESSURE: 108 MMHG | TEMPERATURE: 98.1 F | WEIGHT: 127 LBS | BODY MASS INDEX: 19.25 KG/M2 | DIASTOLIC BLOOD PRESSURE: 86 MMHG | HEART RATE: 78 BPM | OXYGEN SATURATION: 99 % | RESPIRATION RATE: 20 BRPM | HEIGHT: 68 IN

## 2025-03-17 DIAGNOSIS — R11.2 NAUSEA AND VOMITING, UNSPECIFIED VOMITING TYPE: Primary | ICD-10-CM

## 2025-03-17 DIAGNOSIS — R11.2 NAUSEA AND VOMITING, UNSPECIFIED VOMITING TYPE: ICD-10-CM

## 2025-03-17 LAB
ALBUMIN: 4.9 G/DL (ref 3.5–5.2)
ALP BLD-CCNC: 76 U/L (ref 40–129)
ALT SERPL-CCNC: 29 U/L (ref 0–40)
ANION GAP SERPL CALCULATED.3IONS-SCNC: 14 MMOL/L (ref 7–16)
AST SERPL-CCNC: 35 U/L (ref 0–39)
BASOPHILS ABSOLUTE: 0.03 K/UL (ref 0–0.2)
BASOPHILS RELATIVE PERCENT: 0 % (ref 0–2)
BILIRUB SERPL-MCNC: 0.9 MG/DL (ref 0–1.2)
BILIRUBIN DIRECT: <0.2 MG/DL (ref 0–0.3)
BILIRUBIN, INDIRECT: NORMAL MG/DL (ref 0–1)
BUN BLDV-MCNC: 25 MG/DL (ref 6–20)
CALCIUM SERPL-MCNC: 9.6 MG/DL (ref 8.6–10.2)
CHLORIDE BLD-SCNC: 97 MMOL/L (ref 98–107)
CO2: 24 MMOL/L (ref 22–29)
CREAT SERPL-MCNC: 0.9 MG/DL (ref 0.7–1.2)
EOSINOPHILS ABSOLUTE: 0.2 K/UL (ref 0.05–0.5)
EOSINOPHILS RELATIVE PERCENT: 3 % (ref 0–6)
GFR, ESTIMATED: >90 ML/MIN/1.73M2
GLUCOSE BLD-MCNC: 95 MG/DL (ref 74–99)
HCT VFR BLD CALC: 49.2 % (ref 37–54)
HEMOGLOBIN: 17.3 G/DL (ref 12.5–16.5)
IMMATURE GRANULOCYTES %: 0 % (ref 0–5)
IMMATURE GRANULOCYTES ABSOLUTE: <0.03 K/UL (ref 0–0.58)
LYMPHOCYTES ABSOLUTE: 1.78 K/UL (ref 1.5–4)
LYMPHOCYTES RELATIVE PERCENT: 22 % (ref 20–42)
MCH RBC QN AUTO: 32.6 PG (ref 26–35)
MCHC RBC AUTO-ENTMCNC: 35.2 G/DL (ref 32–34.5)
MCV RBC AUTO: 92.7 FL (ref 80–99.9)
MONOCYTES ABSOLUTE: 1.03 K/UL (ref 0.1–0.95)
MONOCYTES RELATIVE PERCENT: 13 % (ref 2–12)
NEUTROPHILS ABSOLUTE: 5.01 K/UL (ref 1.8–7.3)
NEUTROPHILS RELATIVE PERCENT: 62 % (ref 43–80)
PDW BLD-RTO: 12.5 % (ref 11.5–15)
PLATELET # BLD: 233 K/UL (ref 130–450)
PMV BLD AUTO: 9.5 FL (ref 7–12)
POTASSIUM SERPL-SCNC: 3.7 MMOL/L (ref 3.5–5)
RBC # BLD: 5.31 M/UL (ref 3.8–5.8)
SODIUM BLD-SCNC: 135 MMOL/L (ref 132–146)
TOTAL PROTEIN: 7.4 G/DL (ref 6.4–8.3)
WBC # BLD: 8.1 K/UL (ref 4.5–11.5)

## 2025-03-17 PROCEDURE — 99214 OFFICE O/P EST MOD 30 MIN: CPT | Performed by: STUDENT IN AN ORGANIZED HEALTH CARE EDUCATION/TRAINING PROGRAM

## 2025-03-17 RX ORDER — LAMOTRIGINE 100 MG/1
100 TABLET ORAL 2 TIMES DAILY
COMMUNITY
Start: 2025-03-12 | End: 2025-09-08

## 2025-03-17 ASSESSMENT — ENCOUNTER SYMPTOMS
RHINORRHEA: 0
VOMITING: 0
SHORTNESS OF BREATH: 0
COUGH: 0
NAUSEA: 1
ABDOMINAL PAIN: 1

## 2025-03-17 NOTE — PROGRESS NOTES
Negative for dysuria and hematuria.   Musculoskeletal:  Negative for arthralgias and myalgias.   Skin:  Negative for rash and wound.   Neurological:  Negative for dizziness and light-headedness.       Physical Exam   Vitals: /86 (BP Site: Right Upper Arm, Patient Position: Sitting, BP Cuff Size: Medium Adult)   Pulse 78   Temp 98.1 °F (36.7 °C) (Temporal)   Resp 20   Ht 1.727 m (5' 8\")   Wt 57.6 kg (127 lb)   SpO2 99%   BMI 19.31 kg/m²   Physical Exam  Vitals reviewed.   Constitutional:       General: He is not in acute distress.  HENT:      Head: Normocephalic and atraumatic.   Eyes:      Extraocular Movements: Extraocular movements intact.      Conjunctiva/sclera: Conjunctivae normal.   Cardiovascular:      Rate and Rhythm: Normal rate and regular rhythm.   Pulmonary:      Effort: Pulmonary effort is normal.      Breath sounds: Normal breath sounds. No wheezing.   Abdominal:      General: Abdomen is flat. There is no distension.      Tenderness: There is no abdominal tenderness. There is no right CVA tenderness, left CVA tenderness or guarding.   Musculoskeletal:         General: No tenderness or deformity.   Neurological:      General: No focal deficit present.      Mental Status: He is alert and oriented to person, place, and time.         Physical Exam      Assessment and Plan     Assessment & Plan  1. Vomiting and diarrhea.  The differential diagnosis includes potential gallbladder issues, food poisoning, viral influenza, gastroenteritis, or a gastrointestinal bug. He has been advised to gradually reintroduce food into his diet,  while avoiding spicy or fatty foods. He has also been counseled on the importance of maintaining hydration. Blood work will be conducted to assess liver and gallbladder function.. If the blood work indicates any abnormalities, further action will be taken before establishing with PCP  Marco was seen today for vomiting.    Diagnoses and all orders for this

## 2025-03-18 ENCOUNTER — RESULTS FOLLOW-UP (OUTPATIENT)
Dept: FAMILY MEDICINE CLINIC | Age: 35
End: 2025-03-18

## 2025-03-18 DIAGNOSIS — D58.2 ELEVATED HEMOGLOBIN: Primary | ICD-10-CM

## 2025-04-23 DIAGNOSIS — D58.2 ELEVATED HEMOGLOBIN: ICD-10-CM

## 2025-04-23 LAB
BASOPHILS ABSOLUTE: 0.02 K/UL (ref 0–0.2)
BASOPHILS RELATIVE PERCENT: 0 % (ref 0–2)
EOSINOPHILS ABSOLUTE: 0.1 K/UL (ref 0.05–0.5)
EOSINOPHILS RELATIVE PERCENT: 2 % (ref 0–6)
HCT VFR BLD CALC: 44.2 % (ref 37–54)
HEMOGLOBIN: 15.7 G/DL (ref 12.5–16.5)
IMMATURE GRANULOCYTES %: 0 % (ref 0–5)
IMMATURE GRANULOCYTES ABSOLUTE: <0.03 K/UL (ref 0–0.58)
LYMPHOCYTES ABSOLUTE: 1.7 K/UL (ref 1.5–4)
LYMPHOCYTES RELATIVE PERCENT: 30 % (ref 20–42)
MCH RBC QN AUTO: 32.5 PG (ref 26–35)
MCHC RBC AUTO-ENTMCNC: 35.5 G/DL (ref 32–34.5)
MCV RBC AUTO: 91.5 FL (ref 80–99.9)
MONOCYTES ABSOLUTE: 0.58 K/UL (ref 0.1–0.95)
MONOCYTES RELATIVE PERCENT: 10 % (ref 2–12)
NEUTROPHILS ABSOLUTE: 3.24 K/UL (ref 1.8–7.3)
NEUTROPHILS RELATIVE PERCENT: 57 % (ref 43–80)
PDW BLD-RTO: 12 % (ref 11.5–15)
PLATELET # BLD: 248 K/UL (ref 130–450)
PMV BLD AUTO: 9.4 FL (ref 7–12)
RBC # BLD: 4.83 M/UL (ref 3.8–5.8)
WBC # BLD: 5.7 K/UL (ref 4.5–11.5)

## 2025-04-24 ENCOUNTER — RESULTS FOLLOW-UP (OUTPATIENT)
Dept: FAMILY MEDICINE CLINIC | Age: 35
End: 2025-04-24

## 2025-06-26 ENCOUNTER — OFFICE VISIT (OUTPATIENT)
Dept: FAMILY MEDICINE CLINIC | Age: 35
End: 2025-06-26
Payer: COMMERCIAL

## 2025-06-26 VITALS
HEART RATE: 84 BPM | OXYGEN SATURATION: 97 % | TEMPERATURE: 98.6 F | HEIGHT: 68 IN | DIASTOLIC BLOOD PRESSURE: 72 MMHG | SYSTOLIC BLOOD PRESSURE: 110 MMHG | RESPIRATION RATE: 18 BRPM | BODY MASS INDEX: 20.31 KG/M2 | WEIGHT: 134 LBS

## 2025-06-26 DIAGNOSIS — G40.109 FOCAL EPILEPSY (HCC): ICD-10-CM

## 2025-06-26 DIAGNOSIS — R20.0 RIGHT ARM NUMBNESS: ICD-10-CM

## 2025-06-26 DIAGNOSIS — G89.29 ELBOW PAIN, CHRONIC, RIGHT: Primary | ICD-10-CM

## 2025-06-26 DIAGNOSIS — M25.521 ELBOW PAIN, CHRONIC, RIGHT: Primary | ICD-10-CM

## 2025-06-26 PROCEDURE — 99203 OFFICE O/P NEW LOW 30 MIN: CPT | Performed by: INTERNAL MEDICINE

## 2025-06-26 SDOH — ECONOMIC STABILITY: FOOD INSECURITY: WITHIN THE PAST 12 MONTHS, THE FOOD YOU BOUGHT JUST DIDN'T LAST AND YOU DIDN'T HAVE MONEY TO GET MORE.: NEVER TRUE

## 2025-06-26 SDOH — ECONOMIC STABILITY: FOOD INSECURITY: WITHIN THE PAST 12 MONTHS, YOU WORRIED THAT YOUR FOOD WOULD RUN OUT BEFORE YOU GOT MONEY TO BUY MORE.: NEVER TRUE

## 2025-06-26 ASSESSMENT — ENCOUNTER SYMPTOMS
DIARRHEA: 0
SHORTNESS OF BREATH: 0
ABDOMINAL PAIN: 0
RHINORRHEA: 0
SORE THROAT: 0
VOMITING: 0
CHEST TIGHTNESS: 0
BLOOD IN STOOL: 0
CONSTIPATION: 0
COUGH: 0
NAUSEA: 0
EYE PAIN: 0

## 2025-06-26 ASSESSMENT — PATIENT HEALTH QUESTIONNAIRE - PHQ9
SUM OF ALL RESPONSES TO PHQ QUESTIONS 1-9: 0
2. FEELING DOWN, DEPRESSED OR HOPELESS: NOT AT ALL
1. LITTLE INTEREST OR PLEASURE IN DOING THINGS: NOT AT ALL
SUM OF ALL RESPONSES TO PHQ QUESTIONS 1-9: 0

## 2025-06-26 NOTE — PROGRESS NOTES
[facility]   Internal Medicine     2025  Marco Hernandez : 1990 Sex: male  Age:34 y.o.    Chief Complaint   Patient presents with    New Patient    Wrist Pain     On going, R arm         HPI:   Patient presents to office for review and management of chronic medical conditions.    - States has epilepsy disorder. States follows with [facility].   - no current seizure, last known   - previously was on keppra (caused mood changes, depression)   - last visit with neurology per reviewed note (3/2024) - focal epilepsy recommend follow up labs, continue lamotrigine 100mg twice a day follow up in 1 year   - on lamotrigine 100mg twice a day   - stable 2025    - States has been having right wrist pain. States had injury as a child. States has been able to hyperextend joints. States will wake up with tingling in arm. States intermittent     ROS:  Review of Systems   Constitutional:  Negative for appetite change, chills, fever and unexpected weight change.   HENT:  Negative for congestion, rhinorrhea and sore throat.    Eyes:  Negative for pain and visual disturbance.   Respiratory:  Negative for cough, chest tightness and shortness of breath.    Cardiovascular:  Negative for chest pain and palpitations.   Gastrointestinal:  Negative for abdominal pain, blood in stool, constipation, diarrhea, nausea and vomiting.   Genitourinary:  Negative for difficulty urinating, dysuria, hematuria, scrotal swelling, testicular pain and urgency.   Musculoskeletal:  Negative for arthralgias and gait problem.   Skin:  Negative for rash.   Neurological:  Negative for dizziness, syncope, weakness, light-headedness and headaches.   Hematological:  Negative for adenopathy. Does not bruise/bleed easily.   Psychiatric/Behavioral:  Negative for suicidal ideas. The patient is not nervous/anxious.          Current Outpatient Medications:     lamoTRIgine (LAMICTAL) 100 MG tablet, Take 1 tablet by mouth 2 times

## 2025-07-03 ENCOUNTER — TELEPHONE (OUTPATIENT)
Dept: PRIMARY CARE CLINIC | Age: 35
End: 2025-07-03

## 2025-07-03 NOTE — TELEPHONE ENCOUNTER
Please let the patient know that x-ray of the elbow per radiology report was considered normal    No signs of acute fracture or dislocation or other abnormality to the bones no significant arthritis or calcifications    X-rays of the right wrist and hand are still pending from radiology at present    Thanks

## 2025-07-03 NOTE — TELEPHONE ENCOUNTER
Advised Marco of the results of this Xray. He had no questions, and was happy with the results.

## 2025-07-05 ENCOUNTER — TELEPHONE (OUTPATIENT)
Dept: FAMILY MEDICINE CLINIC | Age: 35
End: 2025-07-05

## 2025-07-05 NOTE — TELEPHONE ENCOUNTER
Please let the patient know that remaining x-ray reports per radiology showed    X-ray of the right hand and the right wrist per radiology report was considered negative    No significant arthritic changes no soft tissue calcifications no signs of fractures or dislocations    No obvious abnormality to explain current complaints    Thanks

## 2025-07-07 NOTE — TELEPHONE ENCOUNTER
I tried to reach Marco with his Xray Results, but had to leave a message to call back to the nurse line to discuss this.